# Patient Record
Sex: MALE | Race: WHITE | NOT HISPANIC OR LATINO
[De-identification: names, ages, dates, MRNs, and addresses within clinical notes are randomized per-mention and may not be internally consistent; named-entity substitution may affect disease eponyms.]

---

## 2017-10-23 ENCOUNTER — TRANSCRIPTION ENCOUNTER (OUTPATIENT)
Age: 67
End: 2017-10-23

## 2017-10-25 ENCOUNTER — APPOINTMENT (OUTPATIENT)
Dept: UROLOGY | Facility: CLINIC | Age: 67
End: 2017-10-25
Payer: MEDICARE

## 2017-10-25 VITALS
TEMPERATURE: 98.4 F | RESPIRATION RATE: 16 BRPM | BODY MASS INDEX: 31.5 KG/M2 | SYSTOLIC BLOOD PRESSURE: 126 MMHG | OXYGEN SATURATION: 98 % | HEART RATE: 70 BPM | WEIGHT: 225 LBS | DIASTOLIC BLOOD PRESSURE: 85 MMHG | HEIGHT: 71 IN

## 2017-10-25 DIAGNOSIS — Z86.39 PERSONAL HISTORY OF OTHER ENDOCRINE, NUTRITIONAL AND METABOLIC DISEASE: ICD-10-CM

## 2017-10-25 DIAGNOSIS — E78.00 PURE HYPERCHOLESTEROLEMIA, UNSPECIFIED: ICD-10-CM

## 2017-10-25 PROCEDURE — 99204 OFFICE O/P NEW MOD 45 MIN: CPT

## 2017-10-26 PROBLEM — Z86.39 HISTORY OF HIGH CHOLESTEROL: Status: RESOLVED | Noted: 2017-10-26 | Resolved: 2017-10-26

## 2017-10-26 PROBLEM — E78.00 HIGH CHOLESTEROL: Status: ACTIVE | Noted: 2017-10-26

## 2017-11-14 ENCOUNTER — APPOINTMENT (OUTPATIENT)
Dept: UROLOGY | Facility: CLINIC | Age: 67
End: 2017-11-14
Payer: MEDICARE

## 2017-11-14 VITALS — SYSTOLIC BLOOD PRESSURE: 133 MMHG | DIASTOLIC BLOOD PRESSURE: 81 MMHG

## 2017-11-14 DIAGNOSIS — N41.1 CHRONIC PROSTATITIS: ICD-10-CM

## 2017-11-14 LAB
BILIRUB UR QL STRIP: NORMAL
CLARITY UR: CLEAR
COLLECTION METHOD: NORMAL
GLUCOSE UR-MCNC: NORMAL
HCG UR QL: 0.2 EU/DL
HGB UR QL STRIP.AUTO: NORMAL
KETONES UR-MCNC: NORMAL
LEUKOCYTE ESTERASE UR QL STRIP: NORMAL
NITRITE UR QL STRIP: NORMAL
PH UR STRIP: 5.5
PROT UR STRIP-MCNC: NORMAL
SP GR UR STRIP: 1.02

## 2017-11-14 PROCEDURE — 51741 ELECTRO-UROFLOWMETRY FIRST: CPT

## 2017-11-14 PROCEDURE — 81003 URINALYSIS AUTO W/O SCOPE: CPT | Mod: QW

## 2017-11-14 PROCEDURE — 76872 US TRANSRECTAL: CPT

## 2017-11-14 PROCEDURE — 76857 US EXAM PELVIC LIMITED: CPT

## 2017-11-17 ENCOUNTER — CHART COPY (OUTPATIENT)
Age: 67
End: 2017-11-17

## 2017-11-17 LAB
APPEARANCE: CLEAR
BACTERIA: NEGATIVE
BILIRUBIN URINE: NEGATIVE
BLOOD URINE: NEGATIVE
COLOR: YELLOW
CORE LAB FLUID CYTOLOGY: NORMAL
GLUCOSE QUALITATIVE U: NEGATIVE MG/DL
HYALINE CASTS: 0 /LPF
KETONES URINE: NEGATIVE
LEUKOCYTE ESTERASE URINE: NEGATIVE
MICROSCOPIC-UA: NORMAL
NITRITE URINE: NEGATIVE
PH URINE: 6
PROTEIN URINE: NEGATIVE MG/DL
PSA SERPL-MCNC: 0.58 NG/ML
RED BLOOD CELLS URINE: 0 /HPF
SPECIFIC GRAVITY URINE: 1.01
SQUAMOUS EPITHELIAL CELLS: 0 /HPF
UROBILINOGEN URINE: NEGATIVE MG/DL
WHITE BLOOD CELLS URINE: 0 /HPF

## 2018-03-02 ENCOUNTER — RECORD ABSTRACTING (OUTPATIENT)
Age: 68
End: 2018-03-02

## 2018-03-02 DIAGNOSIS — J33.9 NASAL POLYP, UNSPECIFIED: ICD-10-CM

## 2018-03-02 DIAGNOSIS — Z01.10 ENCOUNTER FOR EXAMINATION OF EARS AND HEARING W/OUT ABNORMAL FINDINGS: ICD-10-CM

## 2018-03-02 DIAGNOSIS — Z91.89 OTHER SPECIFIED PERSONAL RISK FACTORS, NOT ELSEWHERE CLASSIFIED: ICD-10-CM

## 2018-03-02 DIAGNOSIS — Z78.9 OTHER SPECIFIED HEALTH STATUS: ICD-10-CM

## 2018-03-02 DIAGNOSIS — J32.2 CHRONIC ETHMOIDAL SINUSITIS: ICD-10-CM

## 2018-03-02 DIAGNOSIS — Z77.22 CONTACT WITH AND (SUSPECTED) EXPOSURE TO ENVIRONMENTAL TOBACCO SMOKE (ACUTE) (CHRONIC): ICD-10-CM

## 2018-03-02 DIAGNOSIS — F17.210 NICOTINE DEPENDENCE, CIGARETTES, UNCOMPLICATED: ICD-10-CM

## 2018-05-29 ENCOUNTER — APPOINTMENT (OUTPATIENT)
Dept: UROLOGY | Facility: CLINIC | Age: 68
End: 2018-05-29

## 2018-07-16 ENCOUNTER — APPOINTMENT (OUTPATIENT)
Dept: UROLOGY | Facility: CLINIC | Age: 68
End: 2018-07-16
Payer: MEDICARE

## 2018-07-16 VITALS
HEART RATE: 71 BPM | HEIGHT: 71 IN | RESPIRATION RATE: 16 BRPM | TEMPERATURE: 98.3 F | BODY MASS INDEX: 30.8 KG/M2 | DIASTOLIC BLOOD PRESSURE: 72 MMHG | WEIGHT: 220 LBS | SYSTOLIC BLOOD PRESSURE: 104 MMHG | OXYGEN SATURATION: 97 %

## 2018-07-16 PROCEDURE — 99212 OFFICE O/P EST SF 10 MIN: CPT

## 2018-11-17 ENCOUNTER — RX RENEWAL (OUTPATIENT)
Age: 68
End: 2018-11-17

## 2018-11-29 ENCOUNTER — MEDICATION RENEWAL (OUTPATIENT)
Age: 68
End: 2018-11-29

## 2019-02-04 ENCOUNTER — RX RENEWAL (OUTPATIENT)
Age: 69
End: 2019-02-04

## 2019-03-01 ENCOUNTER — MEDICATION RENEWAL (OUTPATIENT)
Age: 69
End: 2019-03-01

## 2019-09-04 ENCOUNTER — APPOINTMENT (OUTPATIENT)
Age: 69
End: 2019-09-04
Payer: MEDICARE

## 2019-09-04 VITALS
WEIGHT: 190 LBS | BODY MASS INDEX: 26.6 KG/M2 | HEART RATE: 116 BPM | HEIGHT: 71 IN | RESPIRATION RATE: 16 BRPM | DIASTOLIC BLOOD PRESSURE: 76 MMHG | SYSTOLIC BLOOD PRESSURE: 116 MMHG | OXYGEN SATURATION: 96 % | TEMPERATURE: 98.1 F

## 2019-09-04 PROCEDURE — 99213 OFFICE O/P EST LOW 20 MIN: CPT

## 2019-09-04 NOTE — REVIEW OF SYSTEMS
[Earache] : earache [see HPI] : see HPI [Poor quality erections] : Poor quality erections [Seen by urologist before (Name)  ___] : Preciously seen by a urologist: [unfilled] [Urine Infection (bladder/kidney)] : bladder/kidney infection [Pain during urination] : pain during urination [Wake up at night to urinate  How many times?  ___] : wakes up to urinate [unfilled] times during the night [Slow urine stream] : slow urine stream [Interrupted urine stream] : interrupted urine stream [Bladder fullness after urinating] : bladder fullness after urinating [Negative] : Heme/Lymph

## 2019-09-04 NOTE — PHYSICAL EXAM
[General Appearance - Well Developed] : well developed [General Appearance - Well Nourished] : well nourished [Normal Appearance] : normal appearance [Well Groomed] : well groomed [General Appearance - In No Acute Distress] : no acute distress [Abdomen Soft] : soft [Costovertebral Angle Tenderness] : no ~M costovertebral angle tenderness [Abdomen Tenderness] : non-tender [Urethral Meatus] : meatus normal [Urinary Bladder Findings] : the bladder was normal on palpation [Scrotum] : the scrotum was normal [Testes Mass (___cm)] : there were no testicular masses [No Prostate Nodules] : no prostate nodules [FreeTextEntry1] : Mild BPH, symmetric [Edema] : no peripheral edema [] : no respiratory distress [Respiration, Rhythm And Depth] : normal respiratory rhythm and effort [Exaggerated Use Of Accessory Muscles For Inspiration] : no accessory muscle use [Oriented To Time, Place, And Person] : oriented to person, place, and time [Affect] : the affect was normal [Mood] : the mood was normal [Not Anxious] : not anxious [Normal Station and Gait] : the gait and station were normal for the patient's age [No Focal Deficits] : no focal deficits [No Palpable Adenopathy] : no palpable adenopathy

## 2019-09-04 NOTE — HISTORY OF PRESENT ILLNESS
[FreeTextEntry1] : This patient presents for evaluation of prostatism. He feels that he is doing well and is not troubled by urinary complaint

## 2019-09-06 LAB
APPEARANCE: CLEAR
BACTERIA: NEGATIVE
BILIRUBIN URINE: NEGATIVE
BLOOD URINE: NEGATIVE
COLOR: YELLOW
GLUCOSE QUALITATIVE U: NEGATIVE
HYALINE CASTS: 2 /LPF
KETONES URINE: NEGATIVE
LEUKOCYTE ESTERASE URINE: NEGATIVE
MICROSCOPIC-UA: NORMAL
NITRITE URINE: NEGATIVE
PH URINE: 6
PROTEIN URINE: NEGATIVE
PSA SERPL-MCNC: 0.5 NG/ML
RED BLOOD CELLS URINE: 1 /HPF
SPECIFIC GRAVITY URINE: 1.02
SQUAMOUS EPITHELIAL CELLS: 1 /HPF
UROBILINOGEN URINE: NORMAL
WHITE BLOOD CELLS URINE: 1 /HPF

## 2019-09-09 LAB — URINE CYTOLOGY: NORMAL

## 2019-09-18 ENCOUNTER — TRANSCRIPTION ENCOUNTER (OUTPATIENT)
Age: 69
End: 2019-09-18

## 2020-07-14 ENCOUNTER — APPOINTMENT (OUTPATIENT)
Dept: OTOLARYNGOLOGY | Facility: CLINIC | Age: 70
End: 2020-07-14
Payer: MEDICARE

## 2020-07-14 VITALS
BODY MASS INDEX: 28 KG/M2 | DIASTOLIC BLOOD PRESSURE: 78 MMHG | SYSTOLIC BLOOD PRESSURE: 113 MMHG | WEIGHT: 200 LBS | HEIGHT: 71 IN | TEMPERATURE: 97.9 F

## 2020-07-14 PROCEDURE — 99213 OFFICE O/P EST LOW 20 MIN: CPT

## 2020-07-14 NOTE — PHYSICAL EXAM
[de-identified] : large amount cerumen cleared ad tm clear cerumen cleared as [Midline] : trachea located in midline position [Normal] : no rashes

## 2020-07-14 NOTE — HISTORY OF PRESENT ILLNESS
[de-identified] : ad plugging x 2 mo like on plane\par pos seasonal allergies, using daily allergra\par hx ad hector and vent tube 2 1/2 y ago

## 2020-07-14 NOTE — REVIEW OF SYSTEMS
[Seasonal Allergies] : seasonal allergies [Ear Itch] : ear itch [Ear Pain] : ear pain [Negative] : Endocrine

## 2020-07-15 ENCOUNTER — APPOINTMENT (OUTPATIENT)
Dept: UROLOGY | Facility: CLINIC | Age: 70
End: 2020-07-15
Payer: MEDICARE

## 2020-07-15 VITALS
OXYGEN SATURATION: 95 % | HEART RATE: 60 BPM | TEMPERATURE: 97.8 F | HEIGHT: 71 IN | DIASTOLIC BLOOD PRESSURE: 75 MMHG | BODY MASS INDEX: 27.72 KG/M2 | WEIGHT: 198 LBS | SYSTOLIC BLOOD PRESSURE: 109 MMHG

## 2020-07-15 PROCEDURE — 99213 OFFICE O/P EST LOW 20 MIN: CPT

## 2020-07-15 NOTE — PHYSICAL EXAM
[General Appearance - Well Developed] : well developed [General Appearance - Well Nourished] : well nourished [Normal Appearance] : normal appearance [Well Groomed] : well groomed [General Appearance - In No Acute Distress] : no acute distress [Abdomen Tenderness] : non-tender [Abdomen Soft] : soft [Costovertebral Angle Tenderness] : no ~M costovertebral angle tenderness [Scrotum] : the scrotum was normal [Urinary Bladder Findings] : the bladder was normal on palpation [Urethral Meatus] : meatus normal [Testes Mass (___cm)] : there were no testicular masses [No Prostate Nodules] : no prostate nodules [Edema] : no peripheral edema [FreeTextEntry1] : Mild BPH, symmetric [] : no respiratory distress [Exaggerated Use Of Accessory Muscles For Inspiration] : no accessory muscle use [Respiration, Rhythm And Depth] : normal respiratory rhythm and effort [Affect] : the affect was normal [Mood] : the mood was normal [Not Anxious] : not anxious [Oriented To Time, Place, And Person] : oriented to person, place, and time [No Focal Deficits] : no focal deficits [Normal Station and Gait] : the gait and station were normal for the patient's age [No Palpable Adenopathy] : no palpable adenopathy

## 2020-07-15 NOTE — HISTORY OF PRESENT ILLNESS
[FreeTextEntry1] : This patient presents for evaluation of prostatism.  He remains on tamsulosin and finasteride and feels that he is doing well on this.  He recently stopped taking his medications for several days and noted definite increase in symptoms with relief once he was taken again

## 2020-07-15 NOTE — REVIEW OF SYSTEMS
[Earache] : earache [Poor quality erections] : Poor quality erections [see HPI] : see HPI [Seen by urologist before (Name)  ___] : Preciously seen by a urologist: [unfilled] [Pain during urination] : pain during urination [Urine Infection (bladder/kidney)] : bladder/kidney infection [Slow urine stream] : slow urine stream [Wake up at night to urinate  How many times?  ___] : wakes up to urinate [unfilled] times during the night [Interrupted urine stream] : interrupted urine stream [Bladder fullness after urinating] : bladder fullness after urinating [Negative] : Psychiatric

## 2020-07-16 LAB
APPEARANCE: CLEAR
BACTERIA: NEGATIVE
BILIRUBIN URINE: NEGATIVE
BLOOD URINE: NEGATIVE
COLOR: YELLOW
GLUCOSE QUALITATIVE U: NEGATIVE
HYALINE CASTS: 1 /LPF
KETONES URINE: NEGATIVE
LEUKOCYTE ESTERASE URINE: ABNORMAL
MICROSCOPIC-UA: NORMAL
NITRITE URINE: NEGATIVE
PH URINE: 6.5
PROTEIN URINE: NORMAL
PSA SERPL-MCNC: 1.55 NG/ML
RED BLOOD CELLS URINE: 2 /HPF
SPECIFIC GRAVITY URINE: 1.03
SQUAMOUS EPITHELIAL CELLS: 1 /HPF
UROBILINOGEN URINE: NORMAL
WHITE BLOOD CELLS URINE: 21 /HPF

## 2020-07-17 LAB — URINE CYTOLOGY: NORMAL

## 2020-10-12 ENCOUNTER — APPOINTMENT (OUTPATIENT)
Dept: OTOLARYNGOLOGY | Facility: CLINIC | Age: 70
End: 2020-10-12
Payer: MEDICARE

## 2020-10-12 VITALS — WEIGHT: 198 LBS | HEIGHT: 71 IN | TEMPERATURE: 97.8 F | BODY MASS INDEX: 27.72 KG/M2

## 2020-10-12 DIAGNOSIS — H60.311 DIFFUSE OTITIS EXTERNA, RIGHT EAR: ICD-10-CM

## 2020-10-12 PROCEDURE — 99213 OFFICE O/P EST LOW 20 MIN: CPT

## 2020-10-12 NOTE — HISTORY OF PRESENT ILLNESS
[de-identified] : allergies sneezing\par several days ago wetness and wax\par using claritin for sneezing

## 2020-10-12 NOTE — PHYSICAL EXAM
[de-identified] : cleared cerumen and debris mild canal erthema [Midline] : trachea located in midline position [Normal] : no rashes

## 2020-10-19 ENCOUNTER — APPOINTMENT (OUTPATIENT)
Dept: OTOLARYNGOLOGY | Facility: CLINIC | Age: 70
End: 2020-10-19
Payer: MEDICARE

## 2020-10-19 VITALS — TEMPERATURE: 98 F | HEIGHT: 71 IN | WEIGHT: 190 LBS | BODY MASS INDEX: 26.6 KG/M2

## 2020-10-19 DIAGNOSIS — H05.011 CELLULITIS OF RIGHT ORBIT: ICD-10-CM

## 2020-10-19 PROCEDURE — 99214 OFFICE O/P EST MOD 30 MIN: CPT

## 2020-10-19 PROCEDURE — 92557 COMPREHENSIVE HEARING TEST: CPT

## 2020-10-19 PROCEDURE — 92567 TYMPANOMETRY: CPT

## 2020-10-19 RX ORDER — AMOXICILLIN AND CLAVULANATE POTASSIUM 875; 125 MG/1; MG/1
875-125 TABLET, COATED ORAL TWICE DAILY
Qty: 20 | Refills: 2 | Status: COMPLETED | COMMUNITY
Start: 2020-10-19 | End: 2020-11-18

## 2020-10-19 NOTE — ASSESSMENT
[FreeTextEntry1] : audio ad cnd loss b tymp ad\par mild rt periorbital cellulitis\par rt hector\par pred 10 #20\par amox clav 875 #20

## 2020-10-19 NOTE — HISTORY OF PRESENT ILLNESS
[de-identified] : co swelling under rt eye x 3 days\par no soreness\par sp ad cleaning  otitis externa\par using ofloxin gtts

## 2020-10-19 NOTE — PHYSICAL EXAM
[Midline] : trachea located in midline position [Normal] : no rashes [FreeTextEntry2] : rt infraorbital swelling and mild erythema, nontender [de-identified] : ad canal w mild erythema tm dull

## 2020-10-30 ENCOUNTER — APPOINTMENT (OUTPATIENT)
Dept: OTOLARYNGOLOGY | Facility: CLINIC | Age: 70
End: 2020-10-30
Payer: MEDICARE

## 2020-10-30 VITALS — HEIGHT: 71 IN | WEIGHT: 190 LBS | TEMPERATURE: 97.4 F | BODY MASS INDEX: 26.6 KG/M2

## 2020-10-30 PROCEDURE — 99213 OFFICE O/P EST LOW 20 MIN: CPT | Mod: 25

## 2020-10-30 PROCEDURE — 69433 CREATE EARDRUM OPENING: CPT | Mod: RT

## 2020-10-30 NOTE — PROCEDURE
[FreeTextEntry2] : rt hector [FreeTextEntry3] : With the patient in an upright position the operating microscope is utilized to visualize the right tympanic membrane which is dull in appearance. retracted\par \par Topical phenyl is applied to the posterior/inferior quadrant.\par \par A small myringotomy incision is placed and a  large amount of  serous fluid is drained.\par \par A standard vent tube is then placed without difficulty.\par \par The procedure was well tolerated.\par \par \par

## 2020-10-30 NOTE — PHYSICAL EXAM
[de-identified] : mike tm retracted dull [Normal] : mucosa is normal [Midline] : trachea located in midline position

## 2020-10-30 NOTE — HISTORY OF PRESENT ILLNESS
[de-identified] : fu persistent ad ear plugging \par rx antibioitic and pred no improvement\par rt hector

## 2020-11-24 ENCOUNTER — APPOINTMENT (OUTPATIENT)
Dept: INTERNAL MEDICINE | Facility: CLINIC | Age: 70
End: 2020-11-24
Payer: MEDICARE

## 2020-11-24 VITALS
RESPIRATION RATE: 16 BRPM | SYSTOLIC BLOOD PRESSURE: 108 MMHG | OXYGEN SATURATION: 96 % | BODY MASS INDEX: 28.56 KG/M2 | TEMPERATURE: 96.6 F | WEIGHT: 204 LBS | HEIGHT: 71 IN | DIASTOLIC BLOOD PRESSURE: 80 MMHG | HEART RATE: 86 BPM

## 2020-11-24 DIAGNOSIS — Z23 ENCOUNTER FOR IMMUNIZATION: ICD-10-CM

## 2020-11-24 DIAGNOSIS — Z72.0 TOBACCO USE: ICD-10-CM

## 2020-11-24 PROCEDURE — 99406 BEHAV CHNG SMOKING 3-10 MIN: CPT | Mod: 25

## 2020-11-24 PROCEDURE — 90662 IIV NO PRSV INCREASED AG IM: CPT

## 2020-11-24 PROCEDURE — G0438: CPT

## 2020-11-24 PROCEDURE — 90732 PPSV23 VACC 2 YRS+ SUBQ/IM: CPT

## 2020-11-24 PROCEDURE — G0008: CPT

## 2020-11-24 PROCEDURE — G0009: CPT

## 2020-11-24 RX ORDER — OFLOXACIN OTIC 3 MG/ML
0.3 SOLUTION AURICULAR (OTIC) TWICE DAILY
Qty: 1 | Refills: 3 | Status: DISCONTINUED | COMMUNITY
Start: 2020-10-12 | End: 2020-11-24

## 2020-11-24 RX ORDER — CIPROFLOXACIN HYDROCHLORIDE 500 MG/1
500 TABLET, FILM COATED ORAL
Qty: 42 | Refills: 0 | Status: DISCONTINUED | COMMUNITY
Start: 2017-10-25 | End: 2020-11-24

## 2020-11-24 RX ORDER — PREDNISONE 10 MG/1
10 TABLET ORAL TWICE DAILY
Qty: 20 | Refills: 1 | Status: DISCONTINUED | COMMUNITY
Start: 2020-10-19 | End: 2020-11-24

## 2020-11-24 RX ORDER — AMOXICILLIN AND CLAVULANATE POTASSIUM 875; 125 MG/1; 1/1
875-125 TABLET, FILM COATED ORAL
Refills: 0 | Status: DISCONTINUED | COMMUNITY
End: 2020-11-24

## 2020-11-24 RX ORDER — PREDNISONE 10 MG/1
10 TABLET ORAL
Refills: 0 | Status: DISCONTINUED | COMMUNITY
End: 2020-11-24

## 2020-11-24 RX ORDER — TAMSULOSIN HYDROCHLORIDE 0.4 MG/1
0.4 CAPSULE ORAL
Qty: 90 | Refills: 3 | Status: DISCONTINUED | COMMUNITY
Start: 2017-11-14 | End: 2020-11-24

## 2020-11-24 RX ORDER — TAMSULOSIN HYDROCHLORIDE 0.4 MG/1
0.4 CAPSULE ORAL
Qty: 7 | Refills: 0 | Status: DISCONTINUED | OUTPATIENT
Start: 2017-10-25 | End: 2020-11-24

## 2020-11-24 NOTE — COUNSELING
[Risk of tobacco use and health benefits of smoking cessation discussed] : Risk of tobacco use and health benefits of smoking cessation discussed [Willing to Quit Smoking] : Not willing to quit smoking [Tobacco Use Cessation Intermediate Greater Than 3 Minutes Up to 10 Minutes] : Tobacco Use Cessation Intermediate Greater Than 3 Minutes Up to 10 Minutes [FreeTextEntry1] : 4

## 2020-11-24 NOTE — HISTORY OF PRESENT ILLNESS
[FreeTextEntry1] : Patient comes in to establish care.\par  [de-identified] : Patient comes in to establish care and annual exam.\par Pt has hx of tobacco use for 30 pack years. He has tried tobacco class and acupuncture in past but did not help him. Has not tried medications at the past. He does have nicotine gum but does not use it. He did have CT lung cancer screen in the past that was negative per patietn. \par Last saw PCP  1 yr ago with normal bw results. Also sees -urologist, had recent bw with normal PSA.\par Patient denies any cp, sob,abdominal pain, nausea, vomiting, palpitations, fever, chills, constipation, diarrhea.\par

## 2020-11-24 NOTE — HEALTH RISK ASSESSMENT
[] : Yes [Yes] : Yes [2 - 3 times a week (3 pts)] : 2 - 3  times a week (3 points) [1 or 2 (0 pts)] : 1 or 2 (0 points) [Never (0 pts)] : Never (0 points) [No] : In the past 12 months have you used drugs other than those required for medical reasons? No [0] : 2) Feeling down, depressed, or hopeless: Not at all (0) [Patient reported colonoscopy was normal] : Patient reported colonoscopy was normal [[id number]] : 0 [ColonoscopyDate] : 2018

## 2020-11-24 NOTE — ASSESSMENT
[FreeTextEntry1] : 1.HM: Pneumovax 23 vaccine given today, Discussed fasting blood work to get.\par \par 2.HLD: recheck fasting lipids, c/w atorvastatin 20 m once daily. refilled today. \par \par 3. BPH: f/u with , c/w tamsulosin and finasteride. PSA normal recently. \par \par 4.tobacco abuse: Counseling provided to the patient for greater than 3 minutes. He does not wish to quit at this time. Patient has had lung cancer screening done in the past.

## 2020-11-25 LAB
ALBUMIN SERPL ELPH-MCNC: 4.5 G/DL
ALP BLD-CCNC: 70 U/L
ALT SERPL-CCNC: 16 U/L
ANION GAP SERPL CALC-SCNC: 8 MMOL/L
AST SERPL-CCNC: 20 U/L
BASOPHILS # BLD AUTO: 0.03 K/UL
BASOPHILS NFR BLD AUTO: 0.5 %
BILIRUB SERPL-MCNC: 0.4 MG/DL
BUN SERPL-MCNC: 13 MG/DL
CALCIUM SERPL-MCNC: 9.4 MG/DL
CHLORIDE SERPL-SCNC: 104 MMOL/L
CHOLEST SERPL-MCNC: 219 MG/DL
CO2 SERPL-SCNC: 26 MMOL/L
CREAT SERPL-MCNC: 0.91 MG/DL
EOSINOPHIL # BLD AUTO: 0.35 K/UL
EOSINOPHIL NFR BLD AUTO: 5.3 %
ESTIMATED AVERAGE GLUCOSE: 114 MG/DL
GLUCOSE SERPL-MCNC: 92 MG/DL
HBA1C MFR BLD HPLC: 5.6 %
HCT VFR BLD CALC: 51.9 %
HDLC SERPL-MCNC: 54 MG/DL
HGB BLD-MCNC: 16.6 G/DL
IMM GRANULOCYTES NFR BLD AUTO: 0.3 %
LDLC SERPL CALC-MCNC: 123 MG/DL
LYMPHOCYTES # BLD AUTO: 2.12 K/UL
LYMPHOCYTES NFR BLD AUTO: 32.2 %
MAN DIFF?: NORMAL
MCHC RBC-ENTMCNC: 32 GM/DL
MCHC RBC-ENTMCNC: 32.2 PG
MCV RBC AUTO: 100.8 FL
MONOCYTES # BLD AUTO: 0.49 K/UL
MONOCYTES NFR BLD AUTO: 7.4 %
NEUTROPHILS # BLD AUTO: 3.57 K/UL
NEUTROPHILS NFR BLD AUTO: 54.3 %
NONHDLC SERPL-MCNC: 165 MG/DL
PLATELET # BLD AUTO: 227 K/UL
POTASSIUM SERPL-SCNC: 4.6 MMOL/L
PROT SERPL-MCNC: 7.6 G/DL
RBC # BLD: 5.15 M/UL
RBC # FLD: 13.2 %
SODIUM SERPL-SCNC: 138 MMOL/L
TRIGL SERPL-MCNC: 211 MG/DL
TSH SERPL-ACNC: 1.37 UIU/ML
WBC # FLD AUTO: 6.58 K/UL

## 2020-11-27 ENCOUNTER — NON-APPOINTMENT (OUTPATIENT)
Age: 70
End: 2020-11-27

## 2021-01-13 ENCOUNTER — APPOINTMENT (OUTPATIENT)
Dept: OTOLARYNGOLOGY | Facility: CLINIC | Age: 71
End: 2021-01-13
Payer: MEDICARE

## 2021-01-13 VITALS — BODY MASS INDEX: 29.4 KG/M2 | TEMPERATURE: 97.9 F | WEIGHT: 210 LBS | HEIGHT: 71 IN

## 2021-01-13 PROCEDURE — 99213 OFFICE O/P EST LOW 20 MIN: CPT

## 2021-01-13 PROCEDURE — 99072 ADDL SUPL MATRL&STAF TM PHE: CPT

## 2021-01-13 NOTE — PHYSICAL EXAM
[de-identified] : dried skin and cerumen cleared nonfunctional tube removed [Midline] : trachea located in midline position [Normal] : no rashes

## 2021-01-13 NOTE — ASSESSMENT
[FreeTextEntry1] : cerumen cleared ad \par symptoms relieved w ad plugging\par resolved hector\par fu prn

## 2021-01-15 ENCOUNTER — APPOINTMENT (OUTPATIENT)
Dept: OTOLARYNGOLOGY | Facility: CLINIC | Age: 71
End: 2021-01-15
Payer: MEDICARE

## 2021-01-15 VITALS — BODY MASS INDEX: 29.4 KG/M2 | WEIGHT: 210 LBS | TEMPERATURE: 97.7 F | HEIGHT: 71 IN

## 2021-01-15 PROCEDURE — 69433 CREATE EARDRUM OPENING: CPT | Mod: RT

## 2021-01-15 PROCEDURE — 99072 ADDL SUPL MATRL&STAF TM PHE: CPT

## 2021-01-15 PROCEDURE — 99213 OFFICE O/P EST LOW 20 MIN: CPT | Mod: 25

## 2021-01-15 NOTE — PHYSICAL EXAM
[Midline] : trachea located in midline position [Normal] : no rashes [de-identified] : ad dull and retracted

## 2021-01-15 NOTE — PROCEDURE
[FreeTextEntry2] : rt hector [FreeTextEntry3] : With the patient in an upright position the operating microscope is utilized to visualize the right tympanic membrane which is dull in appearance.\par \par Topical phenyl is applied to the posterior/inferior quadrant.\par \par A small myringotomy incision is placed and a  large amount of  serous fluid is drained.\par \par A henderson vent tube is then placed without difficulty anterior inferior tm.\par \par The procedure was well tolerated.\par

## 2021-04-09 ENCOUNTER — APPOINTMENT (OUTPATIENT)
Dept: OTOLARYNGOLOGY | Facility: CLINIC | Age: 71
End: 2021-04-09
Payer: MEDICARE

## 2021-04-09 VITALS — TEMPERATURE: 97.8 F | HEIGHT: 71 IN | BODY MASS INDEX: 28.28 KG/M2 | WEIGHT: 202 LBS

## 2021-04-09 PROCEDURE — 99213 OFFICE O/P EST LOW 20 MIN: CPT | Mod: 25

## 2021-04-09 PROCEDURE — 99072 ADDL SUPL MATRL&STAF TM PHE: CPT

## 2021-04-09 PROCEDURE — 69433 CREATE EARDRUM OPENING: CPT | Mod: RT

## 2021-04-09 NOTE — PHYSICAL EXAM
[de-identified] : mike mendoza tm retracted [Midline] : trachea located in midline position [Normal] : no rashes

## 2021-04-09 NOTE — ASSESSMENT
[FreeTextEntry1] : rt hector\par reviewed options\par proceed w ad m and t henderson tub placed without difficulty\par fu 3 mo

## 2021-04-09 NOTE — PROCEDURE
[FreeTextEntry2] : rt hector [FreeTextEntry3] : With the patient in an upright position the operating microscope is utilized to visualize the right tympanic membrane which is dull in appearance.\par \par Topical phenyl is applied to the posterior/inferior quadrant.\par \par A small myringotomy incision is placed and a  large amount of  serous fluid is drained.\par \par A artrong vent tube is then placed without difficulty.\par \par The procedure was well tolerated.\par \par

## 2021-05-17 RX ORDER — TERAZOSIN 5 MG/1
5 CAPSULE ORAL
Qty: 7 | Refills: 0 | Status: DISCONTINUED | OUTPATIENT
Start: 2017-10-25 | End: 2021-05-17

## 2021-08-17 ENCOUNTER — APPOINTMENT (OUTPATIENT)
Dept: OTOLARYNGOLOGY | Facility: CLINIC | Age: 71
End: 2021-08-17
Payer: MEDICARE

## 2021-08-17 VITALS — WEIGHT: 200 LBS | TEMPERATURE: 98.2 F | BODY MASS INDEX: 28 KG/M2 | HEIGHT: 71 IN

## 2021-08-17 DIAGNOSIS — J30.9 ALLERGIC RHINITIS, UNSPECIFIED: ICD-10-CM

## 2021-08-17 DIAGNOSIS — H69.83 OTHER SPECIFIED DISORDERS OF EUSTACHIAN TUBE, BILATERAL: ICD-10-CM

## 2021-08-17 PROCEDURE — 69210 REMOVE IMPACTED EAR WAX UNI: CPT | Mod: RT

## 2021-08-17 PROCEDURE — 99213 OFFICE O/P EST LOW 20 MIN: CPT | Mod: 25

## 2021-08-17 NOTE — PROCEDURE
[Cerumen Impaction] : Cerumen Impaction [FreeTextEntry6] : \par \par \par \par \par \par \par \par \par \par \par \par \par \par Large amount cerumen cleared right ear instrumentation with curettes, forceps and suction.\par Ear canals and tympanic membranes  unremarkable.\par removal ad vent tube nonfunctioning

## 2021-08-17 NOTE — ASSESSMENT
[FreeTextEntry1] : removal nonfunctioning vent tube ad canal\par allergic rhinitis \par eust tube dys\par levocetirizine 5 q hs\par consider ad vent tube

## 2021-08-23 ENCOUNTER — APPOINTMENT (OUTPATIENT)
Dept: UROLOGY | Facility: CLINIC | Age: 71
End: 2021-08-23
Payer: MEDICARE

## 2021-08-23 VITALS
DIASTOLIC BLOOD PRESSURE: 78 MMHG | WEIGHT: 200 LBS | SYSTOLIC BLOOD PRESSURE: 117 MMHG | HEART RATE: 72 BPM | HEIGHT: 71 IN | OXYGEN SATURATION: 93 % | TEMPERATURE: 97.8 F | BODY MASS INDEX: 28 KG/M2

## 2021-08-23 PROCEDURE — 99213 OFFICE O/P EST LOW 20 MIN: CPT

## 2021-08-23 NOTE — PHYSICAL EXAM
[General Appearance - Well Developed] : well developed [Normal Appearance] : normal appearance [General Appearance - Well Nourished] : well nourished [Well Groomed] : well groomed [General Appearance - In No Acute Distress] : no acute distress [Abdomen Soft] : soft [Abdomen Tenderness] : non-tender [Costovertebral Angle Tenderness] : no ~M costovertebral angle tenderness [Urethral Meatus] : meatus normal [Urinary Bladder Findings] : the bladder was normal on palpation [Scrotum] : the scrotum was normal [Testes Mass (___cm)] : there were no testicular masses [No Prostate Nodules] : no prostate nodules [FreeTextEntry1] : Mild BPH, symmetric [Edema] : no peripheral edema [] : no respiratory distress [Respiration, Rhythm And Depth] : normal respiratory rhythm and effort [Exaggerated Use Of Accessory Muscles For Inspiration] : no accessory muscle use [Oriented To Time, Place, And Person] : oriented to person, place, and time [Affect] : the affect was normal [Mood] : the mood was normal [Not Anxious] : not anxious [Normal Station and Gait] : the gait and station were normal for the patient's age [No Focal Deficits] : no focal deficits [No Palpable Adenopathy] : no palpable adenopathy

## 2021-08-23 NOTE — HISTORY OF PRESENT ILLNESS
[FreeTextEntry1] : This patient presents for evaluation of BPH.  He states he is doing well on current medications and needs labs done.

## 2021-08-28 LAB
APPEARANCE: CLEAR
BACTERIA: NEGATIVE
BILIRUBIN URINE: NEGATIVE
BLOOD URINE: NEGATIVE
COLOR: YELLOW
GLUCOSE QUALITATIVE U: NEGATIVE
HYALINE CASTS: 0 /LPF
KETONES URINE: NEGATIVE
LEUKOCYTE ESTERASE URINE: ABNORMAL
MICROSCOPIC-UA: NORMAL
NITRITE URINE: NEGATIVE
PH URINE: 6
PROTEIN URINE: NORMAL
PSA SERPL-MCNC: 0.52 NG/ML
RED BLOOD CELLS URINE: 1 /HPF
SPECIFIC GRAVITY URINE: 1.02
SQUAMOUS EPITHELIAL CELLS: 0 /HPF
UROBILINOGEN URINE: NORMAL
WHITE BLOOD CELLS URINE: 19 /HPF

## 2021-09-04 LAB — URINE CYTOLOGY: NORMAL

## 2021-10-26 RX ORDER — AZITHROMYCIN 500 MG/1
0 TABLET, FILM COATED ORAL
Qty: 0 | Refills: 0 | DISCHARGE
Start: 2021-10-26 | End: 2021-10-30

## 2021-11-04 ENCOUNTER — RX RENEWAL (OUTPATIENT)
Age: 71
End: 2021-11-04

## 2021-11-04 RX ORDER — FINASTERIDE 5 MG/1
5 TABLET, FILM COATED ORAL
Qty: 90 | Refills: 2 | Status: ACTIVE | COMMUNITY
Start: 2017-10-25 | End: 1900-01-01

## 2021-11-15 ENCOUNTER — APPOINTMENT (OUTPATIENT)
Dept: OTOLARYNGOLOGY | Facility: CLINIC | Age: 71
End: 2021-11-15
Payer: MEDICARE

## 2021-11-15 VITALS — WEIGHT: 200 LBS | HEIGHT: 71 IN | TEMPERATURE: 97.6 F | BODY MASS INDEX: 28 KG/M2

## 2021-11-15 DIAGNOSIS — H90.3 SENSORINEURAL HEARING LOSS, BILATERAL: ICD-10-CM

## 2021-11-15 DIAGNOSIS — H65.21 CHRONIC SEROUS OTITIS MEDIA, RIGHT EAR: ICD-10-CM

## 2021-11-15 DIAGNOSIS — H90.11 CONDUCTIVE HEARING LOSS, UNILATERAL, RIGHT EAR, WITH UNRESTRICTED HEARING ON THE CONTRALATERAL SIDE: ICD-10-CM

## 2021-11-15 DIAGNOSIS — H61.21 IMPACTED CERUMEN, RIGHT EAR: ICD-10-CM

## 2021-11-15 PROCEDURE — 69433 CREATE EARDRUM OPENING: CPT | Mod: RT

## 2021-11-15 PROCEDURE — 99213 OFFICE O/P EST LOW 20 MIN: CPT | Mod: 25

## 2021-11-15 NOTE — ASSESSMENT
[FreeTextEntry1] : rt hector\par au high frequency loss\par henderson tube placed\par well tolerated

## 2021-11-15 NOTE — PHYSICAL EXAM
[Midline] : trachea located in midline position [Normal] : temporomandibular joint is normal [de-identified] : ad tm dull witout erythema

## 2021-11-15 NOTE — PROCEDURE
[FreeTextEntry2] : rt hector [FreeTextEntry3] : With the patient in an upright position the operating microscope is utilized to visualize the right tympanic membrane which is dull in appearance.\par \par Topical phenyl is applied to the posterior/inferior quadrant.\par \par A small myringotomy incision is placed and a  large amount amount of  mucoid fluid is drained.\par \par A standard vent tube is then placed without difficulty.\par \par The procedure was well tolerated.\par \par

## 2021-11-15 NOTE — HISTORY OF PRESENT ILLNESS
[de-identified] : co rt ear plugging\par sp ad vent tube 8 mo  using pred  q od without relief\par clear mucous in am

## 2021-11-16 ENCOUNTER — INPATIENT (INPATIENT)
Facility: HOSPITAL | Age: 71
LOS: 2 days | Discharge: HOME | DRG: 309 | End: 2021-11-19
Attending: STUDENT IN AN ORGANIZED HEALTH CARE EDUCATION/TRAINING PROGRAM | Admitting: HOSPITALIST
Payer: MEDICARE

## 2021-11-16 ENCOUNTER — NON-APPOINTMENT (OUTPATIENT)
Age: 71
End: 2021-11-16

## 2021-11-16 VITALS
SYSTOLIC BLOOD PRESSURE: 96 MMHG | DIASTOLIC BLOOD PRESSURE: 81 MMHG | RESPIRATION RATE: 18 BRPM | TEMPERATURE: 98 F | WEIGHT: 199.96 LBS | HEART RATE: 160 BPM | OXYGEN SATURATION: 95 %

## 2021-11-16 DIAGNOSIS — I48.92 UNSPECIFIED ATRIAL FLUTTER: ICD-10-CM

## 2021-11-16 LAB
ALBUMIN SERPL ELPH-MCNC: 3.3 G/DL — SIGNIFICANT CHANGE UP (ref 3.3–5)
ALP SERPL-CCNC: 59 U/L — SIGNIFICANT CHANGE UP (ref 40–120)
ALT FLD-CCNC: 22 U/L — SIGNIFICANT CHANGE UP (ref 12–78)
ANION GAP SERPL CALC-SCNC: 5 MMOL/L — SIGNIFICANT CHANGE UP (ref 5–17)
APPEARANCE UR: ABNORMAL
APTT BLD: 34.1 SEC — SIGNIFICANT CHANGE UP (ref 27.5–35.5)
AST SERPL-CCNC: 19 U/L — SIGNIFICANT CHANGE UP (ref 15–37)
BASOPHILS # BLD AUTO: 0.03 K/UL — SIGNIFICANT CHANGE UP (ref 0–0.2)
BASOPHILS NFR BLD AUTO: 0.3 % — SIGNIFICANT CHANGE UP (ref 0–2)
BILIRUB SERPL-MCNC: 0.6 MG/DL — SIGNIFICANT CHANGE UP (ref 0.2–1.2)
BILIRUB UR-MCNC: NEGATIVE — SIGNIFICANT CHANGE UP
BUN SERPL-MCNC: 16 MG/DL — SIGNIFICANT CHANGE UP (ref 7–23)
CALCIUM SERPL-MCNC: 8.8 MG/DL — SIGNIFICANT CHANGE UP (ref 8.5–10.1)
CHLORIDE SERPL-SCNC: 106 MMOL/L — SIGNIFICANT CHANGE UP (ref 96–108)
CO2 SERPL-SCNC: 29 MMOL/L — SIGNIFICANT CHANGE UP (ref 22–31)
COLOR SPEC: YELLOW — SIGNIFICANT CHANGE UP
CREAT SERPL-MCNC: 1.19 MG/DL — SIGNIFICANT CHANGE UP (ref 0.5–1.3)
DIFF PNL FLD: ABNORMAL
EOSINOPHIL # BLD AUTO: 0.18 K/UL — SIGNIFICANT CHANGE UP (ref 0–0.5)
EOSINOPHIL NFR BLD AUTO: 2.1 % — SIGNIFICANT CHANGE UP (ref 0–6)
GLUCOSE SERPL-MCNC: 127 MG/DL — HIGH (ref 70–99)
GLUCOSE UR QL: NEGATIVE MG/DL — SIGNIFICANT CHANGE UP
HCT VFR BLD CALC: 48.6 % — SIGNIFICANT CHANGE UP (ref 39–50)
HGB BLD-MCNC: 16.4 G/DL — SIGNIFICANT CHANGE UP (ref 13–17)
IMM GRANULOCYTES NFR BLD AUTO: 0.3 % — SIGNIFICANT CHANGE UP (ref 0–1.5)
INR BLD: 1.15 RATIO — SIGNIFICANT CHANGE UP (ref 0.88–1.16)
KETONES UR-MCNC: NEGATIVE — SIGNIFICANT CHANGE UP
LACTATE SERPL-SCNC: 1.4 MMOL/L — SIGNIFICANT CHANGE UP (ref 0.7–2)
LEUKOCYTE ESTERASE UR-ACNC: ABNORMAL
LYMPHOCYTES # BLD AUTO: 2.5 K/UL — SIGNIFICANT CHANGE UP (ref 1–3.3)
LYMPHOCYTES # BLD AUTO: 28.9 % — SIGNIFICANT CHANGE UP (ref 13–44)
MCHC RBC-ENTMCNC: 32.1 PG — SIGNIFICANT CHANGE UP (ref 27–34)
MCHC RBC-ENTMCNC: 33.7 GM/DL — SIGNIFICANT CHANGE UP (ref 32–36)
MCV RBC AUTO: 95.1 FL — SIGNIFICANT CHANGE UP (ref 80–100)
MONOCYTES # BLD AUTO: 0.62 K/UL — SIGNIFICANT CHANGE UP (ref 0–0.9)
MONOCYTES NFR BLD AUTO: 7.2 % — SIGNIFICANT CHANGE UP (ref 2–14)
NEUTROPHILS # BLD AUTO: 5.28 K/UL — SIGNIFICANT CHANGE UP (ref 1.8–7.4)
NEUTROPHILS NFR BLD AUTO: 61.2 % — SIGNIFICANT CHANGE UP (ref 43–77)
NITRITE UR-MCNC: POSITIVE
NT-PROBNP SERPL-SCNC: 1311 PG/ML — HIGH (ref 0–125)
PH UR: 7 — SIGNIFICANT CHANGE UP (ref 5–8)
PLATELET # BLD AUTO: 184 K/UL — SIGNIFICANT CHANGE UP (ref 150–400)
POTASSIUM SERPL-MCNC: 3.7 MMOL/L — SIGNIFICANT CHANGE UP (ref 3.5–5.3)
POTASSIUM SERPL-SCNC: 3.7 MMOL/L — SIGNIFICANT CHANGE UP (ref 3.5–5.3)
PROT SERPL-MCNC: 7.7 GM/DL — SIGNIFICANT CHANGE UP (ref 6–8.3)
PROT UR-MCNC: 15 MG/DL
PROTHROM AB SERPL-ACNC: 13.4 SEC — SIGNIFICANT CHANGE UP (ref 10.6–13.6)
RBC # BLD: 5.11 M/UL — SIGNIFICANT CHANGE UP (ref 4.2–5.8)
RBC # FLD: 12.6 % — SIGNIFICANT CHANGE UP (ref 10.3–14.5)
SODIUM SERPL-SCNC: 140 MMOL/L — SIGNIFICANT CHANGE UP (ref 135–145)
SP GR SPEC: 1.01 — SIGNIFICANT CHANGE UP (ref 1.01–1.02)
TROPONIN I, HIGH SENSITIVITY RESULT: 26.2 NG/L — SIGNIFICANT CHANGE UP
TROPONIN I, HIGH SENSITIVITY RESULT: 28.62 NG/L — SIGNIFICANT CHANGE UP
UROBILINOGEN FLD QL: NEGATIVE MG/DL — SIGNIFICANT CHANGE UP
WBC # BLD: 8.64 K/UL — SIGNIFICANT CHANGE UP (ref 3.8–10.5)
WBC # FLD AUTO: 8.64 K/UL — SIGNIFICANT CHANGE UP (ref 3.8–10.5)

## 2021-11-16 PROCEDURE — 83880 ASSAY OF NATRIURETIC PEPTIDE: CPT

## 2021-11-16 PROCEDURE — 99285 EMERGENCY DEPT VISIT HI MDM: CPT

## 2021-11-16 PROCEDURE — 86803 HEPATITIS C AB TEST: CPT

## 2021-11-16 PROCEDURE — 80053 COMPREHEN METABOLIC PANEL: CPT

## 2021-11-16 PROCEDURE — U0003: CPT

## 2021-11-16 PROCEDURE — 85027 COMPLETE CBC AUTOMATED: CPT

## 2021-11-16 PROCEDURE — 93306 TTE W/DOPPLER COMPLETE: CPT

## 2021-11-16 PROCEDURE — 82962 GLUCOSE BLOOD TEST: CPT

## 2021-11-16 PROCEDURE — 80048 BASIC METABOLIC PNL TOTAL CA: CPT

## 2021-11-16 PROCEDURE — 84484 ASSAY OF TROPONIN QUANT: CPT

## 2021-11-16 PROCEDURE — 71045 X-RAY EXAM CHEST 1 VIEW: CPT | Mod: 26

## 2021-11-16 PROCEDURE — 36415 COLL VENOUS BLD VENIPUNCTURE: CPT

## 2021-11-16 PROCEDURE — 86769 SARS-COV-2 COVID-19 ANTIBODY: CPT

## 2021-11-16 PROCEDURE — 86901 BLOOD TYPING SEROLOGIC RH(D): CPT

## 2021-11-16 PROCEDURE — 76870 US EXAM SCROTUM: CPT | Mod: 26

## 2021-11-16 PROCEDURE — 86850 RBC ANTIBODY SCREEN: CPT

## 2021-11-16 PROCEDURE — 99223 1ST HOSP IP/OBS HIGH 75: CPT

## 2021-11-16 PROCEDURE — 86900 BLOOD TYPING SEROLOGIC ABO: CPT

## 2021-11-16 PROCEDURE — 84443 ASSAY THYROID STIM HORMONE: CPT

## 2021-11-16 PROCEDURE — U0005: CPT

## 2021-11-16 PROCEDURE — 93010 ELECTROCARDIOGRAM REPORT: CPT

## 2021-11-16 RX ORDER — FINASTERIDE 5 MG/1
1 TABLET, FILM COATED ORAL
Qty: 0 | Refills: 0 | DISCHARGE

## 2021-11-16 RX ORDER — HEPARIN SODIUM 5000 [USP'U]/ML
INJECTION INTRAVENOUS; SUBCUTANEOUS
Qty: 25000 | Refills: 0 | Status: DISCONTINUED | OUTPATIENT
Start: 2021-11-16 | End: 2021-11-16

## 2021-11-16 RX ORDER — ENOXAPARIN SODIUM 100 MG/ML
90 INJECTION SUBCUTANEOUS
Refills: 0 | Status: DISCONTINUED | OUTPATIENT
Start: 2021-11-16 | End: 2021-11-17

## 2021-11-16 RX ORDER — SODIUM CHLORIDE 9 MG/ML
1000 INJECTION INTRAMUSCULAR; INTRAVENOUS; SUBCUTANEOUS
Refills: 0 | Status: DISCONTINUED | OUTPATIENT
Start: 2021-11-16 | End: 2021-11-18

## 2021-11-16 RX ORDER — LORATADINE 10 MG/1
10 TABLET ORAL DAILY
Refills: 0 | Status: DISCONTINUED | OUTPATIENT
Start: 2021-11-16 | End: 2021-11-19

## 2021-11-16 RX ORDER — HEPARIN SODIUM 5000 [USP'U]/ML
7500 INJECTION INTRAVENOUS; SUBCUTANEOUS ONCE
Refills: 0 | Status: COMPLETED | OUTPATIENT
Start: 2021-11-16 | End: 2021-11-16

## 2021-11-16 RX ORDER — SODIUM CHLORIDE 9 MG/ML
1000 INJECTION INTRAMUSCULAR; INTRAVENOUS; SUBCUTANEOUS ONCE
Refills: 0 | Status: COMPLETED | OUTPATIENT
Start: 2021-11-16 | End: 2021-11-16

## 2021-11-16 RX ORDER — NICOTINE POLACRILEX 2 MG
1 GUM BUCCAL DAILY
Refills: 0 | Status: DISCONTINUED | OUTPATIENT
Start: 2021-11-16 | End: 2021-11-19

## 2021-11-16 RX ORDER — INFLUENZA VIRUS VACCINE 15; 15; 15; 15 UG/.5ML; UG/.5ML; UG/.5ML; UG/.5ML
0.7 SUSPENSION INTRAMUSCULAR ONCE
Refills: 0 | Status: DISCONTINUED | OUTPATIENT
Start: 2021-11-16 | End: 2021-11-19

## 2021-11-16 RX ORDER — LEVOCETIRIZINE DIHYDROCHLORIDE 0.5 MG/ML
1 SOLUTION ORAL
Qty: 0 | Refills: 0 | DISCHARGE

## 2021-11-16 RX ORDER — HEPARIN SODIUM 5000 [USP'U]/ML
3500 INJECTION INTRAVENOUS; SUBCUTANEOUS EVERY 6 HOURS
Refills: 0 | Status: DISCONTINUED | OUTPATIENT
Start: 2021-11-16 | End: 2021-11-16

## 2021-11-16 RX ORDER — FINASTERIDE 5 MG/1
5 TABLET, FILM COATED ORAL DAILY
Refills: 0 | Status: DISCONTINUED | OUTPATIENT
Start: 2021-11-16 | End: 2021-11-19

## 2021-11-16 RX ORDER — DILTIAZEM HCL 120 MG
60 CAPSULE, EXT RELEASE 24 HR ORAL ONCE
Refills: 0 | Status: COMPLETED | OUTPATIENT
Start: 2021-11-16 | End: 2021-11-16

## 2021-11-16 RX ORDER — TAMSULOSIN HYDROCHLORIDE 0.4 MG/1
0.4 CAPSULE ORAL AT BEDTIME
Refills: 0 | Status: DISCONTINUED | OUTPATIENT
Start: 2021-11-16 | End: 2021-11-19

## 2021-11-16 RX ORDER — ATORVASTATIN CALCIUM 80 MG/1
1 TABLET, FILM COATED ORAL
Qty: 0 | Refills: 0 | DISCHARGE

## 2021-11-16 RX ORDER — ATORVASTATIN CALCIUM 80 MG/1
20 TABLET, FILM COATED ORAL AT BEDTIME
Refills: 0 | Status: DISCONTINUED | OUTPATIENT
Start: 2021-11-16 | End: 2021-11-19

## 2021-11-16 RX ORDER — DILTIAZEM HCL 120 MG
10 CAPSULE, EXT RELEASE 24 HR ORAL ONCE
Refills: 0 | Status: COMPLETED | OUTPATIENT
Start: 2021-11-16 | End: 2021-11-16

## 2021-11-16 RX ORDER — TAMSULOSIN HYDROCHLORIDE 0.4 MG/1
1 CAPSULE ORAL
Qty: 0 | Refills: 0 | DISCHARGE

## 2021-11-16 RX ORDER — HEPARIN SODIUM 5000 [USP'U]/ML
7500 INJECTION INTRAVENOUS; SUBCUTANEOUS EVERY 6 HOURS
Refills: 0 | Status: DISCONTINUED | OUTPATIENT
Start: 2021-11-16 | End: 2021-11-16

## 2021-11-16 RX ORDER — METOPROLOL TARTRATE 50 MG
5 TABLET ORAL ONCE
Refills: 0 | Status: COMPLETED | OUTPATIENT
Start: 2021-11-16 | End: 2021-11-16

## 2021-11-16 RX ORDER — NICOTINE POLACRILEX 2 MG
2 GUM BUCCAL
Refills: 0 | Status: DISCONTINUED | OUTPATIENT
Start: 2021-11-16 | End: 2021-11-19

## 2021-11-16 RX ADMIN — Medication 60 MILLIGRAM(S): at 14:42

## 2021-11-16 RX ADMIN — HEPARIN SODIUM 7500 UNIT(S): 5000 INJECTION INTRAVENOUS; SUBCUTANEOUS at 15:57

## 2021-11-16 RX ADMIN — ATORVASTATIN CALCIUM 20 MILLIGRAM(S): 80 TABLET, FILM COATED ORAL at 22:52

## 2021-11-16 RX ADMIN — TAMSULOSIN HYDROCHLORIDE 0.4 MILLIGRAM(S): 0.4 CAPSULE ORAL at 22:52

## 2021-11-16 RX ADMIN — ENOXAPARIN SODIUM 90 MILLIGRAM(S): 100 INJECTION SUBCUTANEOUS at 22:52

## 2021-11-16 RX ADMIN — Medication 5 MILLIGRAM(S): at 23:49

## 2021-11-16 RX ADMIN — SODIUM CHLORIDE 1000 MILLILITER(S): 9 INJECTION INTRAMUSCULAR; INTRAVENOUS; SUBCUTANEOUS at 14:47

## 2021-11-16 RX ADMIN — SODIUM CHLORIDE 1000 MILLILITER(S): 9 INJECTION INTRAMUSCULAR; INTRAVENOUS; SUBCUTANEOUS at 13:43

## 2021-11-16 RX ADMIN — Medication 10 MILLIGRAM(S): at 13:47

## 2021-11-16 RX ADMIN — HEPARIN SODIUM 1700 UNIT(S)/HR: 5000 INJECTION INTRAVENOUS; SUBCUTANEOUS at 15:56

## 2021-11-16 RX ADMIN — SODIUM CHLORIDE 80 MILLILITER(S): 9 INJECTION INTRAMUSCULAR; INTRAVENOUS; SUBCUTANEOUS at 22:53

## 2021-11-16 RX ADMIN — Medication 1 PATCH: at 20:07

## 2021-11-16 NOTE — ED PROVIDER NOTE - OBJECTIVE STATEMENT
70 y/o M w/ PMHx of BPH,HLD, SHNL, chronic prostatitis presents to ED c/o enlarged L testicle and mild testicle pain  x 2 days. Pt reports on 11/14 he began to feel L testicle pain. Pt reports he initially thought testicle pain was due to having his phone in his pocket for 8 hours. Pt reports testicle is not tender to touch. Denies abd pain, back pain, hematuria.

## 2021-11-16 NOTE — ED PROVIDER NOTE - PROGRESS NOTE DETAILS
Pt with epididymitis without risk factors for GC/Chlam.  Incidentally found to be in aflutter.  No cp/sob/palpitations.  Improved with diltiazem but still in aflutter.  Discussed with Dr. Cooley who recommends a/c.  Started on heparin.  Provided levaquin.  UA positive as well.  Will admit for further w/u and management.

## 2021-11-16 NOTE — ED STATDOCS - PROGRESS NOTE DETAILS
Jimmie SANCHES for ED attending Dr. Lugo: 72 y/o male with left testicular pain for x2 days and subjective founds. Found to be tachy to 160s, will send to [facility] for further eval.

## 2021-11-16 NOTE — ED PROVIDER NOTE - CLINICAL SUMMARY MEDICAL DECISION MAKING FREE TEXT BOX
Will sepsis workup for epididymitis. Pt tachycardia appears to be unrelated to underlying infection cause as pt is currently asymptomatic. I suspect a prior arrythmia either SVT or Afib or aflutter. Pt's repeat BP unremarkable. Will do trial IV fluids and may trial DILT there after.

## 2021-11-16 NOTE — ED ADULT NURSE REASSESSMENT NOTE - NS ED NURSE REASSESS COMMENT FT1
Pt received from JULIÁN Noble-vitals taken and stable. Pt in NSR in the 80s. Updated on plan of care-admission explained. Heparin drip stopped as ordered-pt to be started on Lovenox. Nicotine patch placed to right deltoid. Daughter at bedside. All needs addressed-bed assignment pending. Pt received from JULIÁN Noble-vitals taken and stable. Pt in aflutter in the 80s. Updated on plan of care-admission explained. Heparin drip stopped as ordered-pt to be started on Lovenox. Nicotine patch placed to right deltoid. Daughter at bedside. All needs addressed-bed assignment pending.

## 2021-11-16 NOTE — ED ADULT NURSE NOTE - OBJECTIVE STATEMENT
pt presents to ED c/o L enlarged testicle x 2 days. pt denies associated pain but states testicle felt hard to touch initially and now is edematous. of note, upon assessment pt noted to be in HR 160s, atrial flutter. denies palpitations, dizziness, all associated complaints. denies fevers. rectal temp in ED 99.1. a&ox3. pmhx prostatitis. hx cardiac cath 7 yrs ago @ [facility]

## 2021-11-16 NOTE — ED ADULT TRIAGE NOTE - CHIEF COMPLAINT QUOTE
pt presents to ED due to enlarged testicle since  Sunday pt states he spoke to his PMD who suggested he come to ED for eval, pt denies pain states it feels more like discomfort

## 2021-11-16 NOTE — H&P ADULT - HISTORY OF PRESENT ILLNESS
HPI:  71M w/PMH BPH, HLD, HTN, presents c/o enlarged and "hard" left testicle w/ mild pain over past 2 days. He denies prior similar episode, denies fever/chills, dysuria.  In ED, he was incidentally found to be in atrial flutter in rvr initially, controlled after IVP of dilt. He denies any cp, sob, palpitations or h/o afib/flutter.  He endorses smoking 1.5ppd x50+yrs, denies dx of copd.  Wife at bedside and wants to be updated regarding pt's care daily. At time of my exam, pt states his testicles seem to be back to normal after receiving abx.     In ED, US scrotum : Left-sided epididymitis,  given IV levaquin, +UA, EKG a-flutter, given IVP dilt, HR 80s at time of my exam. bp stable.   Cxr: COPD      PAST MEDICAL & SURGICAL HISTORY:  BPH  HLD (hyperlipidemia)  HTN  No significant past surgical history    Review of Systems:   CONSTITUTIONAL: No fever.  EYES: No eye pain or discharge.  ENMT:  No sinus or throat pain  NECK: No pain or stiffness  RESPIRATORY: No cough, wheezing, chills or hemoptysis; No shortness of breath  CARDIOVASCULAR: No chest pain, palpitations, dizziness, or leg swelling  GASTROINTESTINAL: No abdominal or epigastric pain. No nausea, vomiting, or hematemesis; No diarrhea or constipation. No melena or hematochezia.  GENITOURINARY: No dysuria or incontinence, see hpi ++  NEUROLOGICAL: No headaches, memory loss, loss of strength, numbness, or tremors  SKIN: No rashes.  MUSCULOSKELETAL: No joint pain or swelling; No muscle, back, or extremity pain  PSYCHIATRIC: No depression, anxiety, mood swings, or difficulty sleeping    Allergies  No Known Allergies    Social History:   lives w/ wife  ind ADLs  +smokes 1.5ppd x 50yrs  +etoh on weekends    FAMILY HISTORY:  Father () h/o heart disease  Brother has h/o HD    Home Medications:  atorvastatin 20 mg oral tablet: 1 tab(s) orally once a day (in the morning) (2021 15:34)  Azithromycin 5 Day Dose Pack 250 mg oral tablet: Take 2 tablets by mouth on day 1, then 1 tablet daily on days 2-5  ***Course completed on 10/30/21*** (2021 15:34)  finasteride 5 mg oral tablet: 1 tab(s) orally once a day (in the evening) (2021 15:34)  levocetirizine 5 mg oral tablet: 1 tab(s) orally once a day (in the morning) (2021 15:34)  Pfizer-BioNTech COVID-19 Vaccine PF 30 mcg/0.3 mL intramuscular suspension: 0.3 milliliter(s) intramuscular once  ***3rd dose given in 2021*** (2021 15:34)  predniSONE 20 mg oral tablet: 1 tab(s) orally 2 times a day for 7 days  ***course completed on 21*** (2021 15:34)  tamsulosin 0.4 mg oral capsule: 1 cap(s) orally once a day (at bedtime) (2021 15:34)      MEDICATIONS  (STANDING):  atorvastatin 20 milliGRAM(s) Oral at bedtime  enoxaparin Injectable 90 milliGRAM(s) SubCutaneous two times a day  finasteride 5 milliGRAM(s) Oral daily  loratadine 10 milliGRAM(s) Oral daily  nicotine - 21 mG/24Hr(s) Patch 1 Patch Transdermal daily  sodium chloride 0.9%. 1000 milliLiter(s) (80 mL/Hr) IV Continuous <Continuous>  tamsulosin 0.4 milliGRAM(s) Oral at bedtime    MEDICATIONS  (PRN):  nicotine  Polacrilex Gum 2 milliGRAM(s) Oral every 2 hours PRN breakthrough cravings      PHYSICAL EXAM:  Vital Signs Last 24 Hrs  T(C): 36.4 (2021 21:28), Max: 37.3 (2021 13:25)  T(F): 97.6 (2021 21:28), Max: 99.1 (2021 13:25)  HR: 70 (2021 21:28) (70 - 160)  BP: 102/86 (2021 21:28) (96/81 - 115/92)  BP(mean): 86 (2021 12:53) (86 - 86)  RR: 19 (2021 21:28) (17 - 21)  SpO2: 96% (2021 21:28) (95% - 98%)  GENERAL: NAD, anxious  HEAD:  Atraumatic, Normocephalic  EYES: EOMI, PERRLA, conjunctiva and sclera clear  ENT: normal hearing, no nasal discharge, throat clear, dentition normal  NECK: Supple, No JVD, no LAD, no thyromegaly   CHEST/LUNG: Clear to auscultation bilaterally; No wheeze, respirations unlabored  HEART: Regular rate and rhythm; No murmurs, rubs, or gallops  ABDOMEN: Soft, Nontender, Nondistended; Bowel sounds present, no HSM  : +testicles symmetric, no TTP, +mobile, no erythema  EXTREMITIES:  2+ Peripheral Pulses, No clubbing, cyanosis, or edema  PSYCH: AAOx3, normal behavior  NEUROLOGY: non-focal, sensory and cn 2-12 intact, speech/language intact  SKIN: No visible rashes or lesions    LABS:                        16.4   8.64  )-----------( 184      ( 2021 13:23 )             48.6     11-16    140  |  106  |  16  ----------------------------<  127<H>  3.7   |  29  |  1.19    Ca    8.8      2021 13:23    TPro  7.7  /  Alb  3.3  /  TBili  0.6  /  DBili  x   /  AST  19  /  ALT  22  /  AlkPhos  59  11-16    PT/INR - ( 2021 13:23 )   PT: 13.4 sec;   INR: 1.15 ratio         PTT - ( 2021 13:23 )  PTT:34.1 sec      Urinalysis Basic - ( 2021 13:23 )    Color: Yellow / Appearance: Slightly Turbid / S.015 / pH: x  Gluc: x / Ketone: Negative  / Bili: Negative / Urobili: Negative mg/dL   Blood: x / Protein: 15 mg/dL / Nitrite: Positive   Leuk Esterase: Moderate / RBC: 25-50 /HPF / WBC >50   Sq Epi: x / Non Sq Epi: Moderate / Bacteria: Many        RADIOLOGY & ADDITIONAL TESTS:    Imaging Personally Reviewed:  US scrotum : Left-sided epididymitis  Cxr: COPD  EKG Personally Reviewed:  mark  Consultant(s) Notes Reviewed:    n/a  Care Discussed with Consultants/Other Providers:  ED

## 2021-11-16 NOTE — ED ADULT NURSE REASSESSMENT NOTE - NS ED NURSE REASSESS COMMENT FT1
dinner ordered for patient and tolerated. pt ambulated to bathroom with steady gait. heparin gtt infusing. wife at bedside. updated on POC. pending hospitalist admit orders and bed placement.

## 2021-11-17 LAB
ALBUMIN SERPL ELPH-MCNC: 2.9 G/DL — LOW (ref 3.3–5)
ALP SERPL-CCNC: 50 U/L — SIGNIFICANT CHANGE UP (ref 40–120)
ALT FLD-CCNC: 18 U/L — SIGNIFICANT CHANGE UP (ref 12–78)
ANION GAP SERPL CALC-SCNC: 6 MMOL/L — SIGNIFICANT CHANGE UP (ref 5–17)
AST SERPL-CCNC: 16 U/L — SIGNIFICANT CHANGE UP (ref 15–37)
BILIRUB SERPL-MCNC: 0.7 MG/DL — SIGNIFICANT CHANGE UP (ref 0.2–1.2)
BUN SERPL-MCNC: 13 MG/DL — SIGNIFICANT CHANGE UP (ref 7–23)
CALCIUM SERPL-MCNC: 8.1 MG/DL — LOW (ref 8.5–10.1)
CHLORIDE SERPL-SCNC: 110 MMOL/L — HIGH (ref 96–108)
CO2 SERPL-SCNC: 22 MMOL/L — SIGNIFICANT CHANGE UP (ref 22–31)
COVID-19 NUCLEOCAPSID GAM AB INTERP: NEGATIVE — SIGNIFICANT CHANGE UP
COVID-19 NUCLEOCAPSID TOTAL GAM ANTIBODY RESULT: 0.09 INDEX — SIGNIFICANT CHANGE UP
COVID-19 SPIKE DOMAIN AB INTERP: POSITIVE
COVID-19 SPIKE DOMAIN ANTIBODY RESULT: >250 U/ML — HIGH
CREAT SERPL-MCNC: 1.04 MG/DL — SIGNIFICANT CHANGE UP (ref 0.5–1.3)
GLUCOSE SERPL-MCNC: 102 MG/DL — HIGH (ref 70–99)
HCT VFR BLD CALC: 45.2 % — SIGNIFICANT CHANGE UP (ref 39–50)
HCV AB S/CO SERPL IA: 0.1 S/CO — SIGNIFICANT CHANGE UP (ref 0–0.99)
HCV AB SERPL-IMP: SIGNIFICANT CHANGE UP
HGB BLD-MCNC: 15.2 G/DL — SIGNIFICANT CHANGE UP (ref 13–17)
MCHC RBC-ENTMCNC: 32.3 PG — SIGNIFICANT CHANGE UP (ref 27–34)
MCHC RBC-ENTMCNC: 33.6 GM/DL — SIGNIFICANT CHANGE UP (ref 32–36)
MCV RBC AUTO: 96.2 FL — SIGNIFICANT CHANGE UP (ref 80–100)
PLATELET # BLD AUTO: 150 K/UL — SIGNIFICANT CHANGE UP (ref 150–400)
POTASSIUM SERPL-MCNC: 4.2 MMOL/L — SIGNIFICANT CHANGE UP (ref 3.5–5.3)
POTASSIUM SERPL-SCNC: 4.2 MMOL/L — SIGNIFICANT CHANGE UP (ref 3.5–5.3)
PROT SERPL-MCNC: 6.8 GM/DL — SIGNIFICANT CHANGE UP (ref 6–8.3)
RBC # BLD: 4.7 M/UL — SIGNIFICANT CHANGE UP (ref 4.2–5.8)
RBC # FLD: 12.6 % — SIGNIFICANT CHANGE UP (ref 10.3–14.5)
SARS-COV-2 IGG+IGM SERPL QL IA: 0.09 INDEX — SIGNIFICANT CHANGE UP
SARS-COV-2 IGG+IGM SERPL QL IA: >250 U/ML — HIGH
SARS-COV-2 IGG+IGM SERPL QL IA: NEGATIVE — SIGNIFICANT CHANGE UP
SARS-COV-2 IGG+IGM SERPL QL IA: POSITIVE
SODIUM SERPL-SCNC: 138 MMOL/L — SIGNIFICANT CHANGE UP (ref 135–145)
WBC # BLD: 7.06 K/UL — SIGNIFICANT CHANGE UP (ref 3.8–10.5)
WBC # FLD AUTO: 7.06 K/UL — SIGNIFICANT CHANGE UP (ref 3.8–10.5)

## 2021-11-17 PROCEDURE — 12345: CPT | Mod: NC,GC

## 2021-11-17 PROCEDURE — 99223 1ST HOSP IP/OBS HIGH 75: CPT

## 2021-11-17 PROCEDURE — 93306 TTE W/DOPPLER COMPLETE: CPT | Mod: 26

## 2021-11-17 RX ORDER — DIGOXIN 250 MCG
250 TABLET ORAL ONCE
Refills: 0 | Status: DISCONTINUED | OUTPATIENT
Start: 2021-11-17 | End: 2021-11-17

## 2021-11-17 RX ORDER — DIGOXIN 250 MCG
125 TABLET ORAL ONCE
Refills: 0 | Status: COMPLETED | OUTPATIENT
Start: 2021-11-17 | End: 2021-11-17

## 2021-11-17 RX ORDER — APIXABAN 2.5 MG/1
5 TABLET, FILM COATED ORAL EVERY 12 HOURS
Refills: 0 | Status: DISCONTINUED | OUTPATIENT
Start: 2021-11-17 | End: 2021-11-19

## 2021-11-17 RX ORDER — DILTIAZEM HCL 120 MG
10 CAPSULE, EXT RELEASE 24 HR ORAL
Qty: 125 | Refills: 0 | Status: DISCONTINUED | OUTPATIENT
Start: 2021-11-17 | End: 2021-11-18

## 2021-11-17 RX ORDER — SODIUM CHLORIDE 9 MG/ML
500 INJECTION INTRAMUSCULAR; INTRAVENOUS; SUBCUTANEOUS ONCE
Refills: 0 | Status: COMPLETED | OUTPATIENT
Start: 2021-11-17 | End: 2021-11-17

## 2021-11-17 RX ORDER — CEFTRIAXONE 500 MG/1
2000 INJECTION, POWDER, FOR SOLUTION INTRAMUSCULAR; INTRAVENOUS EVERY 24 HOURS
Refills: 0 | Status: DISCONTINUED | OUTPATIENT
Start: 2021-11-17 | End: 2021-11-19

## 2021-11-17 RX ORDER — DIGOXIN 250 MCG
250 TABLET ORAL ONCE
Refills: 0 | Status: COMPLETED | OUTPATIENT
Start: 2021-11-17 | End: 2021-11-17

## 2021-11-17 RX ADMIN — Medication 10 MG/HR: at 22:29

## 2021-11-17 RX ADMIN — Medication 1 PATCH: at 10:24

## 2021-11-17 RX ADMIN — TAMSULOSIN HYDROCHLORIDE 0.4 MILLIGRAM(S): 0.4 CAPSULE ORAL at 21:47

## 2021-11-17 RX ADMIN — ATORVASTATIN CALCIUM 20 MILLIGRAM(S): 80 TABLET, FILM COATED ORAL at 21:47

## 2021-11-17 RX ADMIN — SODIUM CHLORIDE 500 MILLILITER(S): 9 INJECTION INTRAMUSCULAR; INTRAVENOUS; SUBCUTANEOUS at 04:55

## 2021-11-17 RX ADMIN — Medication 1 PATCH: at 21:38

## 2021-11-17 RX ADMIN — APIXABAN 5 MILLIGRAM(S): 2.5 TABLET, FILM COATED ORAL at 21:47

## 2021-11-17 RX ADMIN — FINASTERIDE 5 MILLIGRAM(S): 5 TABLET, FILM COATED ORAL at 10:19

## 2021-11-17 RX ADMIN — Medication 10 MG/HR: at 06:30

## 2021-11-17 RX ADMIN — Medication 125 MICROGRAM(S): at 02:00

## 2021-11-17 RX ADMIN — CEFTRIAXONE 2000 MILLIGRAM(S): 500 INJECTION, POWDER, FOR SOLUTION INTRAMUSCULAR; INTRAVENOUS at 14:38

## 2021-11-17 RX ADMIN — APIXABAN 5 MILLIGRAM(S): 2.5 TABLET, FILM COATED ORAL at 11:21

## 2021-11-17 RX ADMIN — LORATADINE 10 MILLIGRAM(S): 10 TABLET ORAL at 10:18

## 2021-11-17 RX ADMIN — Medication 100 MILLIGRAM(S): at 16:58

## 2021-11-17 RX ADMIN — Medication 250 MICROGRAM(S): at 03:49

## 2021-11-17 NOTE — CONSULT NOTE ADULT - ASSESSMENT
This is a 71 yr old male with above PMHx who presented to the ED c/o enlarged and hardened left testicle and was found to be in atrial flutter with RVR.  Unknown length of time in AF since he has no symptoms.  CHADSVASC=2 (HTN, Age).  He was started on full dose Lovenox and was changed to PO Eliquis today.  He remains on diltiazem drip for rate control.  2d echo pending to assess LV function.  Has not seen his cardiologist in many years, goes to [place].  He is also on IV antibiotics per ID.    A/P: new onset atrial flutter with RVR in setting of infection  Continue tele monitoring  Continue cardizem drip, titrate down as tolerated, eventual transition to PO  Continue Eliquis  CHADSVASC=2  Keep K>4, Mg>2  Await 2D echo.  Will discuss all of above with EP attending.   This is a 71 yr old male with above PMHx who presented to the ED c/o enlarged and hardened left testicle and was found to be in atrial flutter with RVR.  Unknown length of time in AF since he has no symptoms.  CHADSVASC=2 (HTN, Age).  He was started on full dose Lovenox and was changed to PO Eliquis today.  He remains on diltiazem drip for rate control.  2d echo pending to assess LV function.  Has not seen his cardiologist in many years, goes to [place].  He is also on IV antibiotics per ID.    A/P: new onset atrial flutter with RVR in setting of infection  Continue tele monitoring  Continue cardizem drip, titrate down as tolerated, eventual transition to PO  Continue Eliquis  CHADSVASC=2  Keep K>4, Mg>2  Await 2D echo.  Will discuss all of above with EP attending.  Patient has cardiologist at [place] and may want to have procedures performed there.

## 2021-11-17 NOTE — CONSULT NOTE ADULT - ASSESSMENT
70 y/o Male with h/o BPH, HLD, HTN was admitted on 11/16 for enlarged and "hard" left testicle w/ mild pain over past 2 days PTA. He denies prior similar episode, denies fever/chills, dysuria.  In ED, he was incidentally found to be in atrial flutter in RVR initially, controlled after IVP of dilt. He denies CP, sob, palpitations or h/o afib/flutter. In ED, he was given IV levaquin.    1. Acute left epididimitis. Pyuria. Probable UTI. BPH.  -obtain BC x 2, urine c/s  -start ceftriaxone 2 gm IV qd and doxycyline 100 mg PO q12h   -reason for abx use and side effects reviewed with patient; monitor BMP   -consider urology evaluation  -old chart reviewed to assess prior cultures  -monitor temps  -f/u CBC  -supportive care  2. Other issues:   -care per medicine     72 y/o Male with h/o BPH, HLD, HTN was admitted on 11/16 for enlarged and "hard" left testicle w/ mild pain over past 2 days PTA. He denies prior similar episode, denies fever/chills, dysuria.  In ED, he was incidentally found to be in atrial flutter in RVR initially, controlled after IVP of dilt. He denies CP, sob, palpitations or h/o afib/flutter. In ED, he was given IV levaquin.    1. Acute left epididimitis. Pyuria. Probable UTI. BPH. A,flutter with RVR.   -obtain BC x 2, urine c/s  -start ceftriaxone 2 gm IV qd and doxycyline 100 mg PO q12h   -reason for abx use and side effects reviewed with patient; monitor BMP   -consider urology evaluation  -old chart reviewed to assess prior cultures  -monitor temps  -f/u CBC  -supportive care  2. Other issues:   -care per medicine    d/w Dr. Jaramillo

## 2021-11-17 NOTE — CHART NOTE - NSCHARTNOTEFT_GEN_A_CORE
RN informed me that pt's HR is fluctuating(). Pt was seen by his bed side not in acute distress and have no complaints. On examination- S1 s2 heard regular, chest was clear, and abdomen was soft. His vitals were significant for elevated HR. He has A-Fib on tele. Pt was given metoprolol 5 mg and Digoxin over night but did not show any improvement. Pt was given 500 ml n/s for low blood pressure.  Plan- Cardizem drip was started to control hear rate.   Case was discussed with Dr. Brunson.

## 2021-11-17 NOTE — CONSULT NOTE ADULT - SUBJECTIVE AND OBJECTIVE BOX
HPI:  71M w/PMH BPH, HLD, HTN, presents c/o enlarged and "hard" left testicle w/ mild pain over past 2 days. He denies prior similar episode, denies fever/chills, dysuria.  In ED, he was incidentally found to be in atrial flutter in rvr initially, controlled after IVP of dilt. He denies any cp, sob, palpitations or h/o afib/flutter.  He endorses smoking 1.5ppd x50+yrs, denies dx of copd.  Wife at bedside and wants to be updated regarding pt's care daily. At time of my exam, pt states his testicles seem to be back to normal after receiving abx.     In ED, US scrotum : Left-sided epididymitis,  given IV levaquin, +UA, EKG a-flutter, given IVP dilt, HR 80s at time of my exam. bp stable.       21:  EP asked to see patient for new onset atrial flutter with RVR. Currently rates controlled on diltiazem drip at 5mg/hr. Pt denies any symptoms of palpitations, SOB, CP, dizziness or lightheadedness.  Even during RVR's pt had no symptoms.  TELE: Atrial flutter  bpm, earlier today rates 150-160 bpm  EK/16 2PM: atrial flutter 4:1 81 bpm      PAST MEDICAL & SURGICAL HISTORY:  BPH  HLD (hyperlipidemia)  HTN  No significant past surgical history    Allergies  No Known Allergies    Social History:   lives w/ wife  ind ADLs  +smokes 1.5ppd x 50yrs  +etoh on weekends    FAMILY HISTORY:  Father () h/o heart disease  Brother has h/o HD      MEDICATIONS  (STANDING):  apixaban 5 milliGRAM(s) Oral every 12 hours  atorvastatin 20 milliGRAM(s) Oral at bedtime  cefTRIAXone Injectable. 2000 milliGRAM(s) IV Push every 24 hours  diltiazem Infusion 10 mG/Hr (10 mL/Hr) IV Continuous <Continuous>  doxycycline hyclate Capsule 100 milliGRAM(s) Oral every 12 hours  finasteride 5 milliGRAM(s) Oral daily  influenza  Vaccine (HIGH DOSE) 0.7 milliLiter(s) IntraMuscular once  loratadine 10 milliGRAM(s) Oral daily  nicotine - 21 mG/24Hr(s) Patch 1 Patch Transdermal daily  sodium chloride 0.9%. 1000 milliLiter(s) (80 mL/Hr) IV Continuous <Continuous>  tamsulosin 0.4 milliGRAM(s) Oral at bedtime    MEDICATIONS  (PRN):  nicotine  Polacrilex Gum 2 milliGRAM(s) Oral every 2 hours PRN breakthrough cravings      Urinalysis Basic - ( 2021 13:23 )    Color: Yellow / Appearance: Slightly Turbid / S.015 / pH: x  Gluc: x / Ketone: Negative  / Bili: Negative / Urobili: Negative mg/dL   Blood: x / Protein: 15 mg/dL / Nitrite: Positive   Leuk Esterase: Moderate / RBC: 25-50 /HPF / WBC >50   Sq Epi: x / Non Sq Epi: Moderate / Bacteria: Many        Vital Signs Last 24 Hrs  T(C): 36.5 (2021 09:41), Max: 37 (2021 17:00)  T(F): 97.7 (2021 09:41), Max: 98.6 (2021 17:00)  HR: 84 (2021 09:41) (70 - 149)  BP: 112/57 (2021 09:41) (92/80 - 119/78)  BP(mean): --  RR: 20 (2021 09:41) (17 - 21)  SpO2: 96% (2021 21:28) (96% - 98%)        REVIEW OF SYSTEMS:    CONSTITUTIONAL:  As per HPI.  HEENT:  Eyes:  No diplopia or blurred vision. ENT:  No earache, sore throat or runny nose.  CARDIOVASCULAR:  No pressure, squeezing, strangling, tightness, heaviness or aching about the chest, neck, axilla or epigastrium.  RESPIRATORY:  No cough, shortness of breath, PND or orthopnea.  GASTROINTESTINAL:  No nausea, vomiting or diarrhea.  GENITOURINARY:  As per HPI  MUSCULOSKELETAL:  As per HPI.  SKIN:  No change in skin, hair or nails.  NEUROLOGIC:  No paresthesias, fasciculations, seizures or weakness.  PSYCHIATRIC:  No disorder of thought or mood.  ENDOCRINE:  No heat or cold intolerance, polyuria or polydipsia.  HEMATOLOGICAL:  No easy bruising or bleedings:      PHYSICAL EXAMINATION:    GENERAL APPEARANCE:  Pt. is not currently dyspneic, in no distress. Pt. is alert, oriented, and pleasant.  HEENT:  Pupils are normal and react normally. No icterus. Mucous membranes well colored.  NECK:  Supple. No lymphadenopathy. Jugular venous pressure not elevated. Carotids equal.   HEART:   The cardiac impulse has a normal quality. There are no murmurs, rubs or gallops noted  CHEST:  Chest is clear to auscultation. Normal respiratory effort.  ABDOMEN:  Soft and nontender.   EXTREMITIES:  There is no edema.   SKIN:  No rash or significant lesions are noted.        LABS:                        15.2   7.06  )-----------( 150      ( 2021 07:41 )             45.2     11-17    138  |  110<H>  |  13  ----------------------------<  102<H>  4.2   |  22  |  1.04    Ca    8.1<L>      2021 07:41    TPro  6.8  /  Alb  2.9<L>  /  TBili  0.7  /  DBili  x   /  AST  16  /  ALT  18  /  AlkPhos  50  11-17    LIVER FUNCTIONS - ( 2021 07:41 )  Alb: 2.9 g/dL / Pro: 6.8 gm/dL / ALK PHOS: 50 U/L / ALT: 18 U/L / AST: 16 U/L / GGT: x           PT/INR - ( 2021 13:23 )   PT: 13.4 sec;   INR: 1.15 ratio         PTT - ( 2021 13:23 )  PTT:34.1 sec    TroponinI hsT: <-26.20, <-28.62      Urinalysis Basic - ( 2021 13:23 )    Color: Yellow / Appearance: Slightly Turbid / S.015 / pH: x  Gluc: x / Ketone: Negative  / Bili: Negative / Urobili: Negative mg/dL   Blood: x / Protein: 15 mg/dL / Nitrite: Positive   Leuk Esterase: Moderate / RBC: 25-50 /HPF / WBC >50   Sq Epi: x / Non Sq Epi: Moderate / Bacteria: Many        CARDIAC TESTS:             HPI:  71M w/PMH BPH, HLD, HTN, presents c/o enlarged and "hard" left testicle w/ mild pain over past 2 days. He denies prior similar episode, denies fever/chills, dysuria.  In ED, he was incidentally found to be in atrial flutter in rvr initially, controlled after IVP of dilt. He denies any cp, sob, palpitations or h/o afib/flutter.  He endorses smoking 1.5ppd x50+yrs, denies dx of copd.  Wife at bedside and wants to be updated regarding pt's care daily. At time of my exam, pt states his testicles seem to be back to normal after receiving abx.       21:  EP asked to see patient for new onset atrial flutter with RVR. Currently rates controlled on diltiazem drip at 5mg/hr. Pt denies any symptoms of palpitations, SOB, CP, dizziness or lightheadedness.  Even during RVR's pt had no symptoms.  TELE: Atrial flutter  bpm, earlier today rates 150-160 bpm  EK/16 2PM: atrial flutter 4:1 81 bpm      PAST MEDICAL & SURGICAL HISTORY:  BPH  HLD (hyperlipidemia)  HTN  No significant past surgical history    Allergies  No Known Allergies    Social History:   lives w/ wife  ind ADLs  +smokes 1.5ppd x 50yrs  +etoh on weekends    FAMILY HISTORY:  Father () h/o heart disease  Brother has h/o HD      MEDICATIONS  (STANDING):  apixaban 5 milliGRAM(s) Oral every 12 hours  atorvastatin 20 milliGRAM(s) Oral at bedtime  cefTRIAXone Injectable. 2000 milliGRAM(s) IV Push every 24 hours  diltiazem Infusion 10 mG/Hr (10 mL/Hr) IV Continuous <Continuous>  doxycycline hyclate Capsule 100 milliGRAM(s) Oral every 12 hours  finasteride 5 milliGRAM(s) Oral daily  influenza  Vaccine (HIGH DOSE) 0.7 milliLiter(s) IntraMuscular once  loratadine 10 milliGRAM(s) Oral daily  nicotine - 21 mG/24Hr(s) Patch 1 Patch Transdermal daily  sodium chloride 0.9%. 1000 milliLiter(s) (80 mL/Hr) IV Continuous <Continuous>  tamsulosin 0.4 milliGRAM(s) Oral at bedtime    MEDICATIONS  (PRN):  nicotine  Polacrilex Gum 2 milliGRAM(s) Oral every 2 hours PRN breakthrough cravings      Urinalysis Basic - ( 2021 13:23 )    Color: Yellow / Appearance: Slightly Turbid / S.015 / pH: x  Gluc: x / Ketone: Negative  / Bili: Negative / Urobili: Negative mg/dL   Blood: x / Protein: 15 mg/dL / Nitrite: Positive   Leuk Esterase: Moderate / RBC: 25-50 /HPF / WBC >50   Sq Epi: x / Non Sq Epi: Moderate / Bacteria: Many        Vital Signs Last 24 Hrs  T(C): 36.5 (2021 09:41), Max: 37 (2021 17:00)  T(F): 97.7 (2021 09:41), Max: 98.6 (2021 17:00)  HR: 84 (2021 09:41) (70 - 149)  BP: 112/57 (2021 09:41) (92/80 - 119/78)  BP(mean): --  RR: 20 (2021 09:41) (17 - 21)  SpO2: 96% (2021 21:28) (96% - 98%)        REVIEW OF SYSTEMS:    CONSTITUTIONAL:  As per HPI.  HEENT:  Eyes:  No diplopia or blurred vision. ENT:  No earache, sore throat or runny nose.  CARDIOVASCULAR:  No pressure, squeezing, strangling, tightness, heaviness or aching about the chest, neck, axilla or epigastrium.  RESPIRATORY:  No cough, shortness of breath, PND or orthopnea.  GASTROINTESTINAL:  No nausea, vomiting or diarrhea.  GENITOURINARY:  As per HPI  MUSCULOSKELETAL:  As per HPI.  SKIN:  No change in skin, hair or nails.  NEUROLOGIC:  No paresthesias, fasciculations, seizures or weakness.  PSYCHIATRIC:  No disorder of thought or mood.  ENDOCRINE:  No heat or cold intolerance, polyuria or polydipsia.  HEMATOLOGICAL:  No easy bruising or bleedings:      PHYSICAL EXAMINATION:    GENERAL APPEARANCE:  Pt. is not currently dyspneic, in no distress. Pt. is alert, oriented, and pleasant.  HEENT:  Pupils are normal and react normally. No icterus. Mucous membranes well colored.  NECK:  Supple. No lymphadenopathy. Jugular venous pressure not elevated. Carotids equal.   HEART:   The cardiac impulse has a normal quality. There are no murmurs, rubs or gallops noted  CHEST:  Chest is clear to auscultation. Normal respiratory effort.  ABDOMEN:  Soft and nontender.   EXTREMITIES:  There is no edema.   SKIN:  No rash or significant lesions are noted.        LABS:                        15.2   7.06  )-----------( 150      ( 2021 07:41 )             45.2     11-17    138  |  110<H>  |  13  ----------------------------<  102<H>  4.2   |  22  |  1.04    Ca    8.1<L>      2021 07:41    TPro  6.8  /  Alb  2.9<L>  /  TBili  0.7  /  DBili  x   /  AST  16  /  ALT  18  /  AlkPhos  50  11-17    LIVER FUNCTIONS - ( 2021 07:41 )  Alb: 2.9 g/dL / Pro: 6.8 gm/dL / ALK PHOS: 50 U/L / ALT: 18 U/L / AST: 16 U/L / GGT: x           PT/INR - ( 2021 13:23 )   PT: 13.4 sec;   INR: 1.15 ratio         PTT - ( 2021 13:23 )  PTT:34.1 sec    TroponinI hsT: <-26.20, <-28.62      Urinalysis Basic - ( 2021 13:23 )    Color: Yellow / Appearance: Slightly Turbid / S.015 / pH: x  Gluc: x / Ketone: Negative  / Bili: Negative / Urobili: Negative mg/dL   Blood: x / Protein: 15 mg/dL / Nitrite: Positive   Leuk Esterase: Moderate / RBC: 25-50 /HPF / WBC >50   Sq Epi: x / Non Sq Epi: Moderate / Bacteria: Many        CARDIAC TESTS:

## 2021-11-17 NOTE — PROGRESS NOTE ADULT - SUBJECTIVE AND OBJECTIVE BOX
Reason for Admission: aflutter  History of Present Illness:   HPI:  71M w/PMH BPH, HLD, HTN, presents c/o enlarged and "hard" left testicle w/ mild pain over past 2 days. He denies prior similar episode, denies fever/chills, dysuria.  In ED, he was incidentally found to be in atrial flutter in rvr initially, controlled after IVP of dilt. He denies any cp, sob, palpitations or h/o afib/flutter.  He endorses smoking 1.5ppd x50+yrs, denies dx of copd.  Wife at bedside and wants to be updated regarding pt's care daily. At time of my exam, pt states his testicles seem to be back to normal after receiving abx.     Medical progress: Denies any HA, CP, SOB. no fevers, chills or shakes. Labs and vitals reviewed. Patient with UTI / Atrial fibrillation  Complaints: no new complaints  State of mind: normal coversation    REVIEW OF SYSTEMS:  General: NAD, hemodynamically stable   HEENT:  Eyes:  No visual loss   SKIN:  No rash or itching.  CARDIOVASCULAR:  No chest pain   RESPIRATORY:  No shortness of breath   GASTROINTESTINAL:  No anorexia, nausea   NEUROLOGICAL:  No headache, dizziness, syncope, paralysis, ataxia, numbness or tingling in the extremities. No change in bowel or bladder control.  MUSCULOSKELETAL:  No muscle, back pain, joint pain or stiffness.  HEMATOLOGIC:  No anemia, bleeding or bruising.  LYMPHATICS:  No enlarged nodes. No history of splenectomy.  ENDOCRINOLOGIC:  No reports of sweating, cold or heat intolerance. No polyuria or polydipsia.  ALLERGIES:  No history of asthma, hives, eczema or rhinitis.    Physical Exam:   GENERAL APPEARANCE:  NAD, hemodynamically stable  T(C): 36.6 (11-17-21 @ 16:06), Max: 36.6 (11-17-21 @ 16:06)  HR: 90 (11-17-21 @ 16:06) (70 - 149)  BP: 99/55 (11-17-21 @ 16:06) (92/80 - 119/78)  RR: 20 (11-17-21 @ 09:41) (19 - 20)  SpO2: 96% (11-16-21 @ 21:28) (96% - 96%)  Wt(kg): --  HEENT:  Head is normocephalic    Skin:  Warm and dry without any rash   NECK:  Supple without lymphadenopathy.   HEART:  Regular rate and rhythm. normal S1 and S2, No M/R/G  LUNGS:  Good ins/exp effort, no W/R/R/C  ABDOMEN:  Soft, nontender, nondistended with good bowel sounds heard  EXTREMITIES:  Without cyanosis, clubbing or edema.   NEUROLOGICAL:  Gross nonfocal     Pt is a 71M w/PMH BPH, HLD, HTN, presents c/o enlarged and "hard" left testicle w/ mild pain over past 2 days.   +smoker. In ED, US scrotum : Left-sided epididymitis,  given IV levaquin, +UA, EKG a-flutter, given IVP dilt, HR 80s at time of my exam. bp stable.   Cxr: COPD      # Epididymitis  # UTI  - Rocephin 2g   - doxy 100 mg  - care discussed with Dr. Graham    # New onset atrial flutter with RVR in setting of infection  - tele monitor  - Continue Cardizem drip, titrate down as tolerated, eventual transition to PO  - Continue Eliquis  - CHADSVASC - 2  - Await 2D echo.  - Patient has cardiologist at [facility] [place] and may want to have procedures performed there.    # tobacco use d/o  -  for cessation  - sweta patch and prn sweta gum     #PPX  - for dvt- on lovenox as above.

## 2021-11-17 NOTE — CONSULT NOTE ADULT - SUBJECTIVE AND OBJECTIVE BOX
Patient is a 71y old  Male who presents with a chief complaint of aflutter    HPI:  70 y/o Male with h/o BPH, HLD, HTN was admitted on  for enlarged and "hard" left testicle w/ mild pain over past 2 days PTA. He denies prior similar episode, denies fever/chills, dysuria.  In ED, he was incidentally found to be in atrial flutter in RVR initially, controlled after IVP of dilt. He denies CP, sob, palpitations or h/o afib/flutter. In ED, he was given IV levaquin.    PAST MEDICAL & SURGICAL HISTORY:  BPH  HLD (hyperlipidemia)  HTN  No significant past surgical history    Meds: per reconciliation sheet, noted below  MEDICATIONS  (STANDING):  apixaban 5 milliGRAM(s) Oral every 12 hours  atorvastatin 20 milliGRAM(s) Oral at bedtime  diltiazem Infusion 10 mG/Hr (10 mL/Hr) IV Continuous <Continuous>  finasteride 5 milliGRAM(s) Oral daily  influenza  Vaccine (HIGH DOSE) 0.7 milliLiter(s) IntraMuscular once  levoFLOXacin  Tablet 750 milliGRAM(s) Oral every 24 hours  loratadine 10 milliGRAM(s) Oral daily  nicotine - 21 mG/24Hr(s) Patch 1 Patch Transdermal daily  sodium chloride 0.9%. 1000 milliLiter(s) (80 mL/Hr) IV Continuous <Continuous>  tamsulosin 0.4 milliGRAM(s) Oral at bedtime    MEDICATIONS  (PRN):  nicotine  Polacrilex Gum 2 milliGRAM(s) Oral every 2 hours PRN breakthrough cravings    Allergies    No Known Allergies    Intolerances      Social: He endorses smoking 1.5ppd x50+yrs, no alcohol, no illegal drugs; no recent travel, no exposure to TB  FAMILY HISTORY:  Father () h/o heart disease  Brother has h/o HD    ROS: the patient denies fever, no chills, no HA, no seizures, no dizziness, no sore throat, no nasal congestion, no blurry vision, no CP, no palpitations, no SOB, no cough, no abdominal pain, no diarrhea, no N/V, no dysuria, no leg pain, no claudication, no rash, no joint aches, no rectal pain or bleeding, no night sweats; has left testicular swelling  All other systems reviewed and are negative    Vital Signs Last 24 Hrs  T(C): 36.5 (2021 09:41), Max: 37.3 (2021 13:25)  T(F): 97.7 (2021 09:41), Max: 99.1 (2021 13:25)  HR: 84 (2021 09:41) (70 - 160)  BP: 112/57 (2021 09:41) (92/80 - 119/78)  BP(mean): 86 (2021 12:53) (86 - 86)  RR: 20 (2021 09:41) (17 - 21)  SpO2: 96% (2021 21:28) (95% - 98%)  Daily     Daily Weight in k.3 (2021 06:52)    PE:    Constitutional:  No acute distress  HEENT: NC/AT, EOMI, PERRLA, conjunctivae clear; ears and nose atraumatic; pharynx benign  Neck: supple; thyroid not palpable  Back: no tenderness  Respiratory: respiratory effort normal; clear to auscultation  Cardiovascular: S1S2 regular, no murmurs  Abdomen: soft, not tender, not distended, positive BS; no liver or spleen organomegaly  Genitourinary: no suprapubic tenderness  Left testicular swelling and tenderness  Lymphatic: no LN palpable  Musculoskeletal: no muscle tenderness, no joint swelling or tenderness  Extremities: no pedal edema  Neurological/ Psychiatric: AxOx3, judgement and insight normal; moving all extremities  Skin: no rashes; no palpable lesions    Labs: all available labs reviewed                        15.2   7.06  )-----------( 150      ( 2021 07:41 )             45.2     11-17    138  |  110<H>  |  13  ----------------------------<  102<H>  4.2   |  22  |  1.04    Ca    8.1<L>      2021 07:41    TPro  6.8  /  Alb  2.9<L>  /  TBili  0.7  /  DBili  x   /  AST  16  /  ALT  18  /  AlkPhos  50  11-17     LIVER FUNCTIONS - ( 2021 07:41 )  Alb: 2.9 g/dL / Pro: 6.8 gm/dL / ALK PHOS: 50 U/L / ALT: 18 U/L / AST: 16 U/L / GGT: x           Urinalysis Basic - ( 2021 13:23 )    Color: Yellow / Appearance: Slightly Turbid / S.015 / pH: x  Gluc: x / Ketone: Negative  / Bili: Negative / Urobili: Negative mg/dL   Blood: x / Protein: 15 mg/dL / Nitrite: Positive   Leuk Esterase: Moderate / RBC: 25-50 /HPF / WBC >50   Sq Epi: x / Non Sq Epi: Moderate / Bacteria: Many    COVID-19 PCR: NotDetec (21 @ 13:24)        Radiology: all available radiological tests reviewed    < from: US Testicles (21 @ 14:26) >  Left testis: 3.8 cm x 4.9 cm x 2.0 cm. Normal echogenicity and echotexture with no masses or areas of architectural distortion. Normal arterial and venous blood flow pattern.  Left epididymis: Hypervascularity in the body and tail with enlargement.  < end of copied text >    < from: Xray Chest 1 View-PORTABLE IMMEDIATE (21 @ 13:46) >  IMPRESSION: COPD.  < end of copied text >      Advanced directives addressed: full resuscitation

## 2021-11-18 LAB
ANION GAP SERPL CALC-SCNC: 7 MMOL/L — SIGNIFICANT CHANGE UP (ref 5–17)
BUN SERPL-MCNC: 13 MG/DL — SIGNIFICANT CHANGE UP (ref 7–23)
CALCIUM SERPL-MCNC: 8.4 MG/DL — LOW (ref 8.5–10.1)
CHLORIDE SERPL-SCNC: 109 MMOL/L — HIGH (ref 96–108)
CO2 SERPL-SCNC: 21 MMOL/L — LOW (ref 22–31)
CREAT SERPL-MCNC: 0.88 MG/DL — SIGNIFICANT CHANGE UP (ref 0.5–1.3)
GLUCOSE SERPL-MCNC: 101 MG/DL — HIGH (ref 70–99)
HCT VFR BLD CALC: 43.8 % — SIGNIFICANT CHANGE UP (ref 39–50)
HGB BLD-MCNC: 15 G/DL — SIGNIFICANT CHANGE UP (ref 13–17)
MCHC RBC-ENTMCNC: 32.7 PG — SIGNIFICANT CHANGE UP (ref 27–34)
MCHC RBC-ENTMCNC: 34.2 GM/DL — SIGNIFICANT CHANGE UP (ref 32–36)
MCV RBC AUTO: 95.4 FL — SIGNIFICANT CHANGE UP (ref 80–100)
PLATELET # BLD AUTO: 171 K/UL — SIGNIFICANT CHANGE UP (ref 150–400)
POTASSIUM SERPL-MCNC: 3.7 MMOL/L — SIGNIFICANT CHANGE UP (ref 3.5–5.3)
POTASSIUM SERPL-SCNC: 3.7 MMOL/L — SIGNIFICANT CHANGE UP (ref 3.5–5.3)
RBC # BLD: 4.59 M/UL — SIGNIFICANT CHANGE UP (ref 4.2–5.8)
RBC # FLD: 12.5 % — SIGNIFICANT CHANGE UP (ref 10.3–14.5)
SODIUM SERPL-SCNC: 137 MMOL/L — SIGNIFICANT CHANGE UP (ref 135–145)
WBC # BLD: 6.89 K/UL — SIGNIFICANT CHANGE UP (ref 3.8–10.5)
WBC # FLD AUTO: 6.89 K/UL — SIGNIFICANT CHANGE UP (ref 3.8–10.5)

## 2021-11-18 PROCEDURE — 99233 SBSQ HOSP IP/OBS HIGH 50: CPT

## 2021-11-18 PROCEDURE — 99232 SBSQ HOSP IP/OBS MODERATE 35: CPT

## 2021-11-18 RX ORDER — METOPROLOL TARTRATE 50 MG
5 TABLET ORAL EVERY 6 HOURS
Refills: 0 | Status: DISCONTINUED | OUTPATIENT
Start: 2021-11-18 | End: 2021-11-18

## 2021-11-18 RX ORDER — DILTIAZEM HCL 120 MG
5 CAPSULE, EXT RELEASE 24 HR ORAL
Qty: 125 | Refills: 0 | Status: DISCONTINUED | OUTPATIENT
Start: 2021-11-18 | End: 2021-11-19

## 2021-11-18 RX ORDER — DILTIAZEM HCL 120 MG
30 CAPSULE, EXT RELEASE 24 HR ORAL
Refills: 0 | Status: DISCONTINUED | OUTPATIENT
Start: 2021-11-18 | End: 2021-11-18

## 2021-11-18 RX ORDER — METOPROLOL TARTRATE 50 MG
50 TABLET ORAL EVERY 6 HOURS
Refills: 0 | Status: DISCONTINUED | OUTPATIENT
Start: 2021-11-18 | End: 2021-11-19

## 2021-11-18 RX ADMIN — Medication 1 PATCH: at 20:20

## 2021-11-18 RX ADMIN — Medication 50 MILLIGRAM(S): at 19:35

## 2021-11-18 RX ADMIN — Medication 100 MILLIGRAM(S): at 18:50

## 2021-11-18 RX ADMIN — Medication 1 PATCH: at 10:05

## 2021-11-18 RX ADMIN — CEFTRIAXONE 2000 MILLIGRAM(S): 500 INJECTION, POWDER, FOR SOLUTION INTRAMUSCULAR; INTRAVENOUS at 10:04

## 2021-11-18 RX ADMIN — ATORVASTATIN CALCIUM 20 MILLIGRAM(S): 80 TABLET, FILM COATED ORAL at 21:28

## 2021-11-18 RX ADMIN — Medication 100 MILLIGRAM(S): at 05:07

## 2021-11-18 RX ADMIN — Medication 5 MILLIGRAM(S): at 12:05

## 2021-11-18 RX ADMIN — APIXABAN 5 MILLIGRAM(S): 2.5 TABLET, FILM COATED ORAL at 21:28

## 2021-11-18 RX ADMIN — LORATADINE 10 MILLIGRAM(S): 10 TABLET ORAL at 10:04

## 2021-11-18 RX ADMIN — FINASTERIDE 5 MILLIGRAM(S): 5 TABLET, FILM COATED ORAL at 10:04

## 2021-11-18 RX ADMIN — APIXABAN 5 MILLIGRAM(S): 2.5 TABLET, FILM COATED ORAL at 10:04

## 2021-11-18 RX ADMIN — Medication 5 MG/HR: at 19:40

## 2021-11-18 RX ADMIN — TAMSULOSIN HYDROCHLORIDE 0.4 MILLIGRAM(S): 0.4 CAPSULE ORAL at 21:28

## 2021-11-18 NOTE — PROGRESS NOTE ADULT - ASSESSMENT
70 y/o Male with h/o BPH, HLD, HTN was admitted on 11/16 for enlarged and "hard" left testicle w/ mild pain over past 2 days PTA. He denies prior similar episode, denies fever/chills, dysuria.  In ED, he was incidentally found to be in atrial flutter in RVR initially, controlled after IVP of dilt. He denies CP, sob, palpitations or h/o afib/flutter. In ED, he was given IV levaquin.    1. Acute left epididimitis. Pyuria. UTI with GNR. BPH. A,flutter with RVR.   -BC x 2, urine c/s noted  -on ceftriaxone 2 gm IV qd and doxycyline 100 mg PO q12h # 2  -tolerating abx well so far; no side effects noted  -cardiology evaluation appreciated  -continue abx coverage   -monitor temps  -f/u CBC  -supportive care  2. Other issues:   -care per medicine    d/w Dr. Jaramillo

## 2021-11-18 NOTE — CHART NOTE - NSCHARTNOTEFT_GEN_A_CORE
Patient given Lopressor IV for improved rate cotnroll  Pt hs decided to have ablation here at .  He will be discharged once his heart rate is controlled for 24 hours and then will be scheduled for Monday to have JEANETTE/CV.  The patient was informed as well as his wife.  Pt is competent, has capacity, and understands risks and benefits of  ablation procedure. Risks and benefits discussed. Risk discussed included, but not limited to MI, stroke, mortality, major bleeding, arrythmia, or infection. All questions answered .    Stop Cardizem IV at 6pm and begin Cardizem 30 mgs q 6 hours.  Will continue with Lopressor IV q hour PRN with holds  Pt can be sent home with Metoprolol tartrate 25 mgs to be taken PRN (once daily if necessary for break through arrhthymias. Patient given Lopressor IV for improved rate cotnroll  Pt hs decided to have ablation here at .  He will be discharged once his heart rate is controlled for 24 hours and then will be scheduled for Monday to have JEANETTE and flutter ablation.  The patient was informed as well as his wife.  Pt is competent, has capacity, and understands risks and benefits of  ablation procedure. Risks and benefits discussed. Risk discussed included, but not limited to MI, stroke, mortality, major bleeding, arrythmia, or infection. All questions answered .    Stop Cardizem IV at 6pm and begin Cardizem 30 mgs q 6 hours.  Will continue with Lopressor IV q hour PRN with holds  Pt can be sent home with Metoprolol tartrate 25 mgs to be taken PRN (once daily if necessary for break through arrhthymias.

## 2021-11-18 NOTE — CHART NOTE - NSCHARTNOTEFT_GEN_A_CORE
After further discussion with this pt, he prefers to post pone the ablation.  He has a relationship with Dr. Miramontes at [facility].  Dr. Jaramillo is attempting to make arrangements for transfer to [facility] for this pt.  No Ep procedure will be performed at

## 2021-11-18 NOTE — PROGRESS NOTE ADULT - SUBJECTIVE AND OBJECTIVE BOX
Date of service: 21 @ 09:39    Lying in bed in NAD  Has left scrotal swelling  Has irregular heart rate  Feels anxious    ROS: no fever or chills; denies dizziness, no HA, no SOB or cough, no abdominal pain, no diarrhea or constipation; has dysuria    MEDICATIONS  (STANDING):  apixaban 5 milliGRAM(s) Oral every 12 hours  atorvastatin 20 milliGRAM(s) Oral at bedtime  cefTRIAXone Injectable. 2000 milliGRAM(s) IV Push every 24 hours  diltiazem Infusion 10 mG/Hr (10 mL/Hr) IV Continuous <Continuous>  doxycycline hyclate Capsule 100 milliGRAM(s) Oral every 12 hours  finasteride 5 milliGRAM(s) Oral daily  influenza  Vaccine (HIGH DOSE) 0.7 milliLiter(s) IntraMuscular once  loratadine 10 milliGRAM(s) Oral daily  nicotine - 21 mG/24Hr(s) Patch 1 Patch Transdermal daily  sodium chloride 0.9%. 1000 milliLiter(s) (80 mL/Hr) IV Continuous <Continuous>  tamsulosin 0.4 milliGRAM(s) Oral at bedtime    Vital Signs Last 24 Hrs  T(C): 36.7 (2021 08:25), Max: 36.8 (2021 20:38)  T(F): 98.1 (2021 08:25), Max: 98.3 (2021 20:38)  HR: 124 (2021 08:25) (80 - 124)  BP: 97/80 (2021 08:25) (97/80 - 115/89)  BP(mean): --  RR: 20 (2021 08:25) (20 - 20)  SpO2: 97% (2021 08:25) (96% - 97%)     Physical exam:    Constitutional:  No acute distress  HEENT: NC/AT, EOMI, PERRLA, conjunctivae clear; ears and nose atraumatic  Neck: supple; thyroid not palpable  Back: no tenderness  Respiratory: respiratory effort normal; clear to auscultation  Cardiovascular: S1S2 regular, no murmurs  Abdomen: soft, not tender, not distended, positive BS  Genitourinary: no suprapubic tenderness  Left testicular swelling and tenderness  Lymphatic: no LN palpable  Musculoskeletal: no muscle tenderness, no joint swelling or tenderness  Extremities: no pedal edema  Neurological/ Psychiatric: AxOx3, judgement and insight normal; moving all extremities  Skin: no rashes; no palpable lesions    Labs: reviewed                        15.0   6.89  )-----------( 171      ( 2021 07:45 )             43.8     11-18    137  |  109<H>  |  13  ----------------------------<  101<H>  3.7   |  21<L>  |  0.88    Ca    8.4<L>      2021 07:45    TPro  6.8  /  Alb  2.9<L>  /  TBili  0.7  /  DBili  x   /  AST  16  /  ALT  18  /  AlkPhos  50                          15.2   7.06  )-----------( 150      ( 2021 07:41 )             45.2     11-17    138  |  110<H>  |  13  ----------------------------<  102<H>  4.2   |  22  |  1.04    Ca    8.1<L>      2021 07:41    TPro  6.8  /  Alb  2.9<L>  /  TBili  0.7  /  DBili  x   /  AST  16  /  ALT  18  /  AlkPhos  50  11-17     LIVER FUNCTIONS - ( 2021 07:41 )  Alb: 2.9 g/dL / Pro: 6.8 gm/dL / ALK PHOS: 50 U/L / ALT: 18 U/L / AST: 16 U/L / GGT: x           Urinalysis Basic - ( 2021 13:23 )    Color: Yellow / Appearance: Slightly Turbid / S.015 / pH: x  Gluc: x / Ketone: Negative  / Bili: Negative / Urobili: Negative mg/dL   Blood: x / Protein: 15 mg/dL / Nitrite: Positive   Leuk Esterase: Moderate / RBC: 25-50 /HPF / WBC >50   Sq Epi: x / Non Sq Epi: Moderate / Bacteria: Many    COVID-19 PCR: Luis Armando (21 @ 13:24)      Culture - Blood (collected 2021 13:30)  Source: .Blood Blood-Peripheral  Preliminary Report (2021 19:02):    No growth to date.    Culture - Urine (collected 2021 13:23)  Source: Clean Catch Clean Catch (Midstream)  Preliminary Report (2021 07:50):    50,000 - 99,000 CFU/mL Gram Negative Rods    Culture - Blood (collected 2021 13:23)  Source: .Blood Blood-Peripheral  Preliminary Report (2021 19:02):    No growth to date.    Radiology: all available radiological tests reviewed    < from: US Testicles (21 @ 14:26) >  Left testis: 3.8 cm x 4.9 cm x 2.0 cm. Normal echogenicity and echotexture with no masses or areas of architectural distortion. Normal arterial and venous blood flow pattern.  Left epididymis: Hypervascularity in the body and tail with enlargement.  < end of copied text >    < from: Xray Chest 1 View-PORTABLE IMMEDIATE (21 @ 13:46) >  IMPRESSION: COPD.  < end of copied text >      Advanced directives addressed: full resuscitation

## 2021-11-18 NOTE — PROGRESS NOTE ADULT - SUBJECTIVE AND OBJECTIVE BOX
71M w/PMH BPH, HLD, HTN, presents c/o enlarged and "hard" left testicle w/ mild pain over past 2 days. He denies prior similar episode, denies fever/chills, dysuria.  In ED, he was incidentally found to be in atrial flutter in rvr initially, controlled after IVP of dilt. He denies any cp, sob, palpitations or h/o afib/flutter.  He endorses smoking 1.5ppd x50+yrs, denies dx of copd.  Wife at bedside and wants to be updated regarding pt's care daily. At time of my exam, pt states his testicles seem to be back to normal after receiving abx.       21:  EP asked to see patient for new onset atrial flutter with RVR. Currently rates controlled on diltiazem drip at 5mg/hr. Pt denies any symptoms of palpitations, SOB, CP, dizziness or lightheadedness.  Even during RVR's pt had no symptoms.    :  Pt in At Flutter with RVR    PAST MEDICAL & SURGICAL HISTORY:  BPH  HLD (hyperlipidemia)  HTN  No significant past surgical history    Social History:   lives w/ wife  ind ADLs  +smokes 1.5ppd x 50yrs  +etoh on weekends    FAMILY HISTORY:  Father () h/o heart disease  Brother has h/o HD      Review of Systems:   CONSTITUTIONAL: No fever.  EYES: No eye pain or discharge.  ENMT:  No sinus or throat pain  NECK: No pain or stiffness  RESPIRATORY: No cough, wheezing, chills or hemoptysis; No shortness of breath  CARDIOVASCULAR: No chest pain, palpitations, dizziness, or leg swelling  GASTROINTESTINAL: No abdominal or epigastric pain. No nausea, vomiting, or hematemesis; No diarrhea or constipation. No melena or hematochezia.  GENITOURINARY: No dysuria or incontinence, see hpi ++  NEUROLOGICAL: No headaches, memory loss, loss of strength, numbness, or tremors  SKIN: No rashes.  MUSCULOSKELETAL: No joint pain or swelling; No muscle, back, or extremity pain  PSYCHIATRIC: No depression, anxiety, mood swings, or difficulty sleeping    Allergies  No Known Allergies      Home Medications:  atorvastatin 20 mg oral tablet: 1 tab(s) orally once a day (in the morning) (2021 15:34)  Azithromycin 5 Day Dose Pack 250 mg oral tablet: Take 2 tablets by mouth on day 1, then 1 tablet daily on days 2-5  ***Course completed on 10/30/21*** (2021 15:34)  finasteride 5 mg oral tablet: 1 tab(s) orally once a day (in the evening) (2021 15:34)  levocetirizine 5 mg oral tablet: 1 tab(s) orally once a day (in the morning) (2021 15:34)  Pfizer-BioNTech COVID-19 Vaccine PF 30 mcg/0.3 mL intramuscular suspension: 0.3 milliliter(s) intramuscular once  ***3rd dose given in 2021*** (2021 15:34)  predniSONE 20 mg oral tablet: 1 tab(s) orally 2 times a day for 7 days  ***course completed on 21*** (2021 15:34)  tamsulosin 0.4 mg oral capsule: 1 cap(s) orally once a day (at bedtime) (2021 15:34)      MEDICATIONS  (STANDING):  atorvastatin 20 milliGRAM(s) Oral at bedtime  enoxaparin Injectable 90 milliGRAM(s) SubCutaneous two times a day  finasteride 5 milliGRAM(s) Oral daily  loratadine 10 milliGRAM(s) Oral daily  nicotine - 21 mG/24Hr(s) Patch 1 Patch Transdermal daily  sodium chloride 0.9%. 1000 milliLiter(s) (80 mL/Hr) IV Continuous <Continuous>  tamsulosin 0.4 milliGRAM(s) Oral at bedtime    MEDICATIONS  (PRN):  nicotine  Polacrilex Gum 2 milliGRAM(s) Oral every 2 hours PRN breakthrough cravings      PHYSICAL EXAM:  Vital Signs Last 24 Hrs  T(C): 36.4 (2021 21:28), Max: 37.3 (2021 13:25)  T(F): 97.6 (:28), Max: 99.1 (2021 13:25)  HR: 70 (2021 21:28) (70 - 160)  BP: 102/86 (2021 21:28) (96/81 - 115/92)  BP(mean): 86 (2021 12:53) (86 - 86)  RR: 19 (2021 21:28) (17 - 21)  SpO2: 96% (2021 21:28) (95% - 98%)    GENERAL: NAD, anxious  HEAD:  Atraumatic, Normocephalic  EYES: EOMI, PERRLA, conjunctiva and sclera clear  ENT: normal hearing, no nasal discharge, throat clear, dentition normal  NECK: Supple, No JVD, no LAD, no thyromegaly   CHEST/LUNG: Clear to auscultation bilaterally; No wheeze, respirations unlabored  HEART: Regular rate and rhythm; No murmurs, rubs, or gallops  ABDOMEN: Soft, Nontender, Nondistended; Bowel sounds present, no HSM  : +testicles symmetric, no TTP, +mobile, no erythema  EXTREMITIES:  2+ Peripheral Pulses, No clubbing, cyanosis, or edema  PSYCH: AAOx3, normal behavior  NEUROLOGY: non-focal, sensory and cn 2-12 intact, speech/language intact  SKIN: No visible rashes or lesions    LABS:                        16.4   8.64  )-----------( 184      ( 2021 13:23 )             48.6               TELE: At flutter with rates 150 since this am    MEDICATIONS  (STANDING):  apixaban 5 milliGRAM(s) Oral every 12 hours  atorvastatin 20 milliGRAM(s) Oral at bedtime  cefTRIAXone Injectable. 2000 milliGRAM(s) IV Push every 24 hours  diltiazem Infusion 10 mG/Hr (10 mL/Hr) IV Continuous <Continuous>  doxycycline hyclate Capsule 100 milliGRAM(s) Oral every 12 hours  finasteride 5 milliGRAM(s) Oral daily  influenza  Vaccine (HIGH DOSE) 0.7 milliLiter(s) IntraMuscular once  loratadine 10 milliGRAM(s) Oral daily  nicotine - 21 mG/24Hr(s) Patch 1 Patch Transdermal daily  sodium chloride 0.9%. 1000 milliLiter(s) (80 mL/Hr) IV Continuous <Continuous>  tamsulosin 0.4 milliGRAM(s) Oral at bedtime    MEDICATIONS  (PRN):  nicotine  Polacrilex Gum 2 milliGRAM(s) Oral every 2 hours PRN breakthrough cravings      Allergies    No Known Allergies    Intolerances                  LABS:                        15.0   6.89  )-----------( 171      ( 2021 07:45 )             43.8     11-18    137  |  109<H>  |  13  ----------------------------<  101<H>  3.7   |  21<L>  |  0.88    Ca    8.4<L>      2021 07:45    TPro  6.8  /  Alb  2.9<L>  /  TBili  0.7  /  DBili  x   /  AST  16  /  ALT  18  /  AlkPhos  50  11-17    PT/INR - ( 2021 13:23 )   PT: 13.4 sec;   INR: 1.15 ratio         PTT - ( 2021 13:23 )  PTT:34.1 sec  Urinalysis Basic - ( 2021 13:23 )    Color: Yellow / Appearance: Slightly Turbid / S.015 / pH: x  Gluc: x / Ketone: Negative  / Bili: Negative / Urobili: Negative mg/dL   Blood: x / Protein: 15 mg/dL / Nitrite: Positive   Leuk Esterase: Moderate / RBC: 25-50 /HPF / WBC >50   Sq Epi: x / Non Sq Epi: Moderate / Bacteria: Many            RADIOLOGY & ADDITIONAL TESTS:    < from: TTE Echo Complete w/o Contrast w/ Doppler (21 @ 13:58) >  mpression     Summary     Normal appearing mitral valve structure.   Trace mitral regurgitation is present.   Normal aortic valve structure and function.   The tricuspid valve leaflets are thin and pliable; valve motion is normal.   Trace tricuspid valve regurgitation is present.   Normal appearing pulmonic valve structure.  Trace pulmonic valvular regurgitation is present.   Normal appearing left atrium.   Left ventricle systolic function appears preserved in the presence of a   cardiac arrhythmia. Left ventricle size and structure are within normal   limitations. Visual estimation of left ventricle ejection fraction is   60-65%.     Signature    < end of copied text >

## 2021-11-18 NOTE — PROGRESS NOTE ADULT - ASSESSMENT
71 year old male admitted with epidiymis, ID is following this pt.   EP has been consulted for management of  At Flutter with RVR-he has been on Eliquis.  LVEF is 55% (11/17/21-Echo)  PMH BPH, HLD, HTN,  Pt to have  ablation in the am  Consent obtained for ablation. Pt is competent, has capacity, and understands risks and benefits of procedure. Risks and benefits discussed.   Risk discussed included, but not limited to MI, stroke, mortality, major bleeding, arrythmia, or infection. All questions answered .  Carto requested for this procedure        NPO after midnight   Lopressor IV q 6 hours for heart rate greater than 130 bpm   Type and screen for this pt  Continue with Eliquis   Continue with Cardizem drip  Continue with ID and recommendations for infections control.  Covid swab was obtained within 72 of this procedure scheduled for am     71 year old male admitted with epidiymis, ID is following this pt.   EP has been consulted for management of  At Flutter with RVR-he has been on Eliquis.  LVEF is 55% (11/17/21-Echo)  PMH BPH, HLD, HTN,  Pt to have  ablation in the am  Consent obtained for ablation. Pt is competent, has capacity.        NPO after midnight   Lopressor IV q 6 hours for heart rate greater than 130 bpm   Type and screen for this pt  Continue with Eliquis   Continue with Cardizem drip  Continue with ID and recommendations for infections control.  Covid swab was obtained within 72 of this procedure scheduled for am    Addendum:  please see chart note to follow in regards to patients prefernce     71 year old male admitted with epidiymis, ID is following this pt.   EP has been consulted for management of  At Flutter with RVR-he has been on Eliquis.  LVEF is 55% (11/17/21-Echo)  PMH BPH, HLD, HTN,  Pt to have JEANETTE followed by atrial flutter ablation in the am  Consent obtained for ablation. Pt is competent, has capacity.        NPO after midnight   Lopressor IV q 6 hours for heart rate greater than 130 bpm   Type and screen for this pt  Continue with Eliquis   Continue with Cardizem drip  Continue with ID and recommendations for infections control.  Covid swab was obtained within 72 of this procedure scheduled for am    Addendum:  please see chart note to follow in regards to patients prefernce

## 2021-11-18 NOTE — PROGRESS NOTE ADULT - SUBJECTIVE AND OBJECTIVE BOX
Reason for Admission: aflutter  History of Present Illness:   HPI:  71M w/PMH BPH, HLD, HTN, presents c/o enlarged and "hard" left testicle w/ mild pain over past 2 days. He denies prior similar episode, denies fever/chills, dysuria.  In ED, he was incidentally found to be in atrial flutter in rvr initially, controlled after IVP of dilt. He denies any cp, sob, palpitations or h/o afib/flutter.  He endorses smoking 1.5ppd x50+yrs, denies dx of copd.  Wife at bedside and wants to be updated regarding pt's care daily. At time of my exam, pt states his testicles seem to be back to normal after receiving abx.     Medical progress: Poor HR control still in Atrial flutter with RVR. Patient is anxious. I reached out to Dr. Fu -  whose office is noting that patient has not been seen in the past in their office and follows up with dr. Edmond. Patient's wife will communicate with their office.   Complaints: no new complaints  State of mind: normal conversation    REVIEW OF SYSTEMS:  General: NAD, hemodynamically stable   HEENT:  Eyes:  No visual loss   SKIN:  No rash or itching.  CARDIOVASCULAR:  No chest pain   RESPIRATORY:  No shortness of breath   GASTROINTESTINAL:  No anorexia, nausea   NEUROLOGICAL:  No headache, dizziness, syncope, paralysis, ataxia, numbness or tingling in the extremities. No change in bowel or bladder control.  MUSCULOSKELETAL:  No muscle, back pain, joint pain or stiffness.  HEMATOLOGIC:  No anemia, bleeding or bruising.  LYMPHATICS:  No enlarged nodes. No history of splenectomy.  ENDOCRINOLOGIC:  No reports of sweating, cold or heat intolerance. No polyuria or polydipsia.  ALLERGIES:  No history of asthma, hives, eczema or rhinitis.    Physical Exam:   GENERAL APPEARANCE:  NAD, hemodynamically stable  T(C): 36.7 (18 Nov 2021 08:25), Max: 36.8 (17 Nov 2021 20:38)  T(F): 98.1 (18 Nov 2021 08:25), Max: 98.3 (17 Nov 2021 20:38)  HR: 155 (18 Nov 2021 12:05) (80 - 155)  BP: 117/67 (18 Nov 2021 11:57) (97/80 - 117/67)  RR: 20 (18 Nov 2021 08:25) (20 - 20)  SpO2: 97% (18 Nov 2021 08:25) (96% - 97%)  HEENT:  Head is normocephalic    Skin:  Warm and dry without any rash   NECK:  Supple without lymphadenopathy.   HEART:  Regular rate and rhythm. normal S1 and S2, No M/R/G  LUNGS:  Good ins/exp effort, no W/R/R/C  ABDOMEN:  Soft, nontender, nondistended with good bowel sounds heard  EXTREMITIES:  Without cyanosis, clubbing or edema.   NEUROLOGICAL:  Gross nonfocal     Pt is a 71M w/PMH BPH, HLD, HTN, presents c/o enlarged and "hard" left testicle w/ mild pain over past 2 days.   +smoker. In ED, US scrotum : Left-sided epididymitis,  given IV levaquin, +UA, EKG a-flutter, given IVP dilt, HR 80s at time of my exam. bp stable.   Cxr: COPD      # Epididymitis  # UTI  - Rocephin 2g day # 2  - Doxy 100 mg day # 2  - care discussed with Dr. Graham    # New onset atrial flutter with RVR in setting of infection  - tele monitor -  still poorly controlled HR  - Continue Cardizem drip, titrate down as tolerated, eventual transition to PO  - lopressor 5 mg IV q6h PRN  - Continue Eliquis  - CHADSVASC - 2  - Patient has cardiologist at [place] and may want to have procedures performed there.    # tobacco use d/o  -  for cessation  - sweta patch and prn sweta gum     #PPX  - for dvt- on lovenox as above.        Reason for Admission: aflutter  History of Present Illness:   HPI:  71M w/PMH BPH, HLD, HTN, presents c/o enlarged and "hard" left testicle w/ mild pain over past 2 days. He denies prior similar episode, denies fever/chills, dysuria.  In ED, he was incidentally found to be in atrial flutter in rvr initially, controlled after IVP of dilt. He denies any cp, sob, palpitations or h/o afib/flutter.  He endorses smoking 1.5ppd x50+yrs, denies dx of copd.  Wife at bedside and wants to be updated regarding pt's care daily. At time of my exam, pt states his testicles seem to be back to normal after receiving abx.     Medical progress: Poor HR control still in Atrial flutter with RVR. Patient is anxious. I reached out to Dr. Fu -  whose office is noting that patient has not been seen in the past in their office and follows up with dr. Edmond. Patient's wife will communicate with their office.   Complaints: no new complaints  State of mind: normal conversation    REVIEW OF SYSTEMS:  General: NAD, hemodynamically stable   HEENT:  Eyes:  No visual loss   SKIN:  No rash or itching.  CARDIOVASCULAR:  No chest pain   RESPIRATORY:  No shortness of breath   GASTROINTESTINAL:  No anorexia, nausea   NEUROLOGICAL:  No headache, dizziness, syncope, paralysis, ataxia, numbness or tingling in the extremities. No change in bowel or bladder control.  MUSCULOSKELETAL:  No muscle, back pain, joint pain or stiffness.  HEMATOLOGIC:  No anemia, bleeding or bruising.  LYMPHATICS:  No enlarged nodes. No history of splenectomy.  ENDOCRINOLOGIC:  No reports of sweating, cold or heat intolerance. No polyuria or polydipsia.  ALLERGIES:  No history of asthma, hives, eczema or rhinitis.    Physical Exam:   GENERAL APPEARANCE:  NAD, hemodynamically stable  T(C): 36.7 (18 Nov 2021 08:25), Max: 36.8 (17 Nov 2021 20:38)  T(F): 98.1 (18 Nov 2021 08:25), Max: 98.3 (17 Nov 2021 20:38)  HR: 155 (18 Nov 2021 12:05) (80 - 155)  BP: 117/67 (18 Nov 2021 11:57) (97/80 - 117/67)  RR: 20 (18 Nov 2021 08:25) (20 - 20)  SpO2: 97% (18 Nov 2021 08:25) (96% - 97%)  HEENT:  Head is normocephalic    Skin:  Warm and dry without any rash   NECK:  Supple without lymphadenopathy.   HEART:  Regular rate and rhythm. normal S1 and S2, No M/R/G  LUNGS:  Good ins/exp effort, no W/R/R/C  ABDOMEN:  Soft, nontender, nondistended with good bowel sounds heard  EXTREMITIES:  Without cyanosis, clubbing or edema.   NEUROLOGICAL:  Gross nonfocal     Pt is a 71M w/PMH BPH, HLD, HTN, presents c/o enlarged and "hard" left testicle w/ mild pain over past 2 days.   +smoker. In ED, US scrotum : Left-sided epididymitis,  given IV levaquin, +UA, EKG a-flutter, given IVP dilt, HR 80s at time of my exam. bp stable.   Cxr: COPD      # Epididymitis  # UTI  - Rocephin 2g day # 2  - Doxy 100 mg day # 2  - ECHO: Left testis: 3.8 cm x 4.9 cm x 2.0 cm. Normal echogenicity and echotexture with no masses or areas of architectural distortion. Normal arterial and venous blood flow pattern.  Left epididymis: Hypervascularity in the body and tail with enlargement.  - care discussed with Dr. Graham    # New onset atrial flutter with RVR in setting of infection  - tele monitor -  still poorly controlled HR  - Continue Cardizem drip, titrate down as tolerated, eventual transition to PO  - lopressor 5 mg IV q6h PRN  - Continue Eliquis  - CHADSVASC - 2  - Patient has cardiologist at [place] and may want to have procedures performed there.    # tobacco use d/o  -  for cessation  - sweta patch and prn sweta gum     #PPX  - for dvt- on lovenox as above.

## 2021-11-19 ENCOUNTER — TRANSCRIPTION ENCOUNTER (OUTPATIENT)
Age: 71
End: 2021-11-19

## 2021-11-19 VITALS
DIASTOLIC BLOOD PRESSURE: 81 MMHG | OXYGEN SATURATION: 96 % | RESPIRATION RATE: 18 BRPM | HEART RATE: 82 BPM | TEMPERATURE: 98 F | SYSTOLIC BLOOD PRESSURE: 106 MMHG

## 2021-11-19 PROBLEM — Z87.438 PERSONAL HISTORY OF OTHER DISEASES OF MALE GENITAL ORGANS: Chronic | Status: ACTIVE | Noted: 2021-11-16

## 2021-11-19 PROBLEM — E78.5 HYPERLIPIDEMIA, UNSPECIFIED: Chronic | Status: ACTIVE | Noted: 2021-11-16

## 2021-11-19 LAB
-  AMIKACIN: SIGNIFICANT CHANGE UP
-  AMOXICILLIN/CLAVULANIC ACID: SIGNIFICANT CHANGE UP
-  AMPICILLIN/SULBACTAM: SIGNIFICANT CHANGE UP
-  AMPICILLIN: SIGNIFICANT CHANGE UP
-  AZTREONAM: SIGNIFICANT CHANGE UP
-  CEFAZOLIN: SIGNIFICANT CHANGE UP
-  CEFEPIME: SIGNIFICANT CHANGE UP
-  CEFOXITIN: SIGNIFICANT CHANGE UP
-  CEFTRIAXONE: SIGNIFICANT CHANGE UP
-  CIPROFLOXACIN: SIGNIFICANT CHANGE UP
-  ERTAPENEM: SIGNIFICANT CHANGE UP
-  GENTAMICIN: SIGNIFICANT CHANGE UP
-  IMIPENEM: SIGNIFICANT CHANGE UP
-  LEVOFLOXACIN: SIGNIFICANT CHANGE UP
-  MEROPENEM: SIGNIFICANT CHANGE UP
-  NITROFURANTOIN: SIGNIFICANT CHANGE UP
-  PIPERACILLIN/TAZOBACTAM: SIGNIFICANT CHANGE UP
-  TIGECYCLINE: SIGNIFICANT CHANGE UP
-  TOBRAMYCIN: SIGNIFICANT CHANGE UP
-  TRIMETHOPRIM/SULFAMETHOXAZOLE: SIGNIFICANT CHANGE UP
ANION GAP SERPL CALC-SCNC: 5 MMOL/L — SIGNIFICANT CHANGE UP (ref 5–17)
BUN SERPL-MCNC: 12 MG/DL — SIGNIFICANT CHANGE UP (ref 7–23)
CALCIUM SERPL-MCNC: 8.2 MG/DL — LOW (ref 8.5–10.1)
CHLORIDE SERPL-SCNC: 112 MMOL/L — HIGH (ref 96–108)
CO2 SERPL-SCNC: 22 MMOL/L — SIGNIFICANT CHANGE UP (ref 22–31)
CREAT SERPL-MCNC: 1.01 MG/DL — SIGNIFICANT CHANGE UP (ref 0.5–1.3)
CULTURE RESULTS: SIGNIFICANT CHANGE UP
GLUCOSE SERPL-MCNC: 105 MG/DL — HIGH (ref 70–99)
HCT VFR BLD CALC: 43 % — SIGNIFICANT CHANGE UP (ref 39–50)
HCT VFR BLD CALC: 44.2 % — SIGNIFICANT CHANGE UP (ref 39–50)
HGB BLD-MCNC: 14.5 G/DL — SIGNIFICANT CHANGE UP (ref 13–17)
HGB BLD-MCNC: 14.9 G/DL — SIGNIFICANT CHANGE UP (ref 13–17)
MCHC RBC-ENTMCNC: 32.2 PG — SIGNIFICANT CHANGE UP (ref 27–34)
MCHC RBC-ENTMCNC: 32.3 PG — SIGNIFICANT CHANGE UP (ref 27–34)
MCHC RBC-ENTMCNC: 33.7 GM/DL — SIGNIFICANT CHANGE UP (ref 32–36)
MCHC RBC-ENTMCNC: 33.7 GM/DL — SIGNIFICANT CHANGE UP (ref 32–36)
MCV RBC AUTO: 95.6 FL — SIGNIFICANT CHANGE UP (ref 80–100)
MCV RBC AUTO: 95.9 FL — SIGNIFICANT CHANGE UP (ref 80–100)
METHOD TYPE: SIGNIFICANT CHANGE UP
ORGANISM # SPEC MICROSCOPIC CNT: SIGNIFICANT CHANGE UP
ORGANISM # SPEC MICROSCOPIC CNT: SIGNIFICANT CHANGE UP
PLATELET # BLD AUTO: 164 K/UL — SIGNIFICANT CHANGE UP (ref 150–400)
PLATELET # BLD AUTO: 173 K/UL — SIGNIFICANT CHANGE UP (ref 150–400)
POTASSIUM SERPL-MCNC: 4 MMOL/L — SIGNIFICANT CHANGE UP (ref 3.5–5.3)
POTASSIUM SERPL-SCNC: 4 MMOL/L — SIGNIFICANT CHANGE UP (ref 3.5–5.3)
RBC # BLD: 4.5 M/UL — SIGNIFICANT CHANGE UP (ref 4.2–5.8)
RBC # BLD: 4.61 M/UL — SIGNIFICANT CHANGE UP (ref 4.2–5.8)
RBC # FLD: 12.5 % — SIGNIFICANT CHANGE UP (ref 10.3–14.5)
RBC # FLD: 12.7 % — SIGNIFICANT CHANGE UP (ref 10.3–14.5)
SARS-COV-2 RNA SPEC QL NAA+PROBE: SIGNIFICANT CHANGE UP
SODIUM SERPL-SCNC: 139 MMOL/L — SIGNIFICANT CHANGE UP (ref 135–145)
SPECIMEN SOURCE: SIGNIFICANT CHANGE UP
TSH SERPL-MCNC: 1.12 UU/ML — SIGNIFICANT CHANGE UP (ref 0.34–4.82)
WBC # BLD: 6.73 K/UL — SIGNIFICANT CHANGE UP (ref 3.8–10.5)
WBC # BLD: 7.15 K/UL — SIGNIFICANT CHANGE UP (ref 3.8–10.5)
WBC # FLD AUTO: 6.73 K/UL — SIGNIFICANT CHANGE UP (ref 3.8–10.5)
WBC # FLD AUTO: 7.15 K/UL — SIGNIFICANT CHANGE UP (ref 3.8–10.5)

## 2021-11-19 PROCEDURE — 99239 HOSP IP/OBS DSCHRG MGMT >30: CPT

## 2021-11-19 PROCEDURE — 99232 SBSQ HOSP IP/OBS MODERATE 35: CPT

## 2021-11-19 RX ORDER — SODIUM CHLORIDE 9 MG/ML
1000 INJECTION INTRAMUSCULAR; INTRAVENOUS; SUBCUTANEOUS ONCE
Refills: 0 | Status: COMPLETED | OUTPATIENT
Start: 2021-11-19 | End: 2021-11-19

## 2021-11-19 RX ORDER — CEFUROXIME AXETIL 250 MG
1 TABLET ORAL
Qty: 42 | Refills: 0
Start: 2021-11-19 | End: 2021-12-09

## 2021-11-19 RX ORDER — APIXABAN 2.5 MG/1
1 TABLET, FILM COATED ORAL
Qty: 60 | Refills: 0
Start: 2021-11-19 | End: 2021-12-18

## 2021-11-19 RX ORDER — METOPROLOL TARTRATE 50 MG
1 TABLET ORAL
Qty: 80 | Refills: 0
Start: 2021-11-19 | End: 2021-12-08

## 2021-11-19 RX ORDER — BNT162B2 0.23 MG/2.25ML
0.3 INJECTION, SUSPENSION INTRAMUSCULAR
Qty: 0 | Refills: 0 | DISCHARGE

## 2021-11-19 RX ADMIN — CEFTRIAXONE 2000 MILLIGRAM(S): 500 INJECTION, POWDER, FOR SOLUTION INTRAMUSCULAR; INTRAVENOUS at 10:20

## 2021-11-19 RX ADMIN — LORATADINE 10 MILLIGRAM(S): 10 TABLET ORAL at 10:20

## 2021-11-19 RX ADMIN — Medication 50 MILLIGRAM(S): at 18:13

## 2021-11-19 RX ADMIN — Medication 50 MILLIGRAM(S): at 10:21

## 2021-11-19 RX ADMIN — Medication 1 PATCH: at 10:21

## 2021-11-19 RX ADMIN — SODIUM CHLORIDE 500 MILLILITER(S): 9 INJECTION INTRAMUSCULAR; INTRAVENOUS; SUBCUTANEOUS at 03:54

## 2021-11-19 RX ADMIN — APIXABAN 5 MILLIGRAM(S): 2.5 TABLET, FILM COATED ORAL at 10:20

## 2021-11-19 RX ADMIN — FINASTERIDE 5 MILLIGRAM(S): 5 TABLET, FILM COATED ORAL at 10:20

## 2021-11-19 RX ADMIN — Medication 1 PATCH: at 10:20

## 2021-11-19 RX ADMIN — Medication 100 MILLIGRAM(S): at 06:18

## 2021-11-19 NOTE — DISCHARGE NOTE PROVIDER - NSDCCPCAREPLAN_GEN_ALL_CORE_FT
PRINCIPAL DISCHARGE DIAGNOSIS  Diagnosis: Atrial flutter  Assessment and Plan of Treatment: Continue tele monitoring  Continue beta blocker PO  Continue Eliquis  CHADSVASC=2  Remains on IV antibiotics per ID.  Keep K>4, Mg>2  Normal LV function EF 60-65%  Pt to have outpatient atrial flutter ablation in next 1-2 weeks  F/U with Dr. Guerra as outpatient.        SECONDARY DISCHARGE DIAGNOSES  Diagnosis: Left epididymitis  Assessment and Plan of Treatment: - ceftin 500 q12h total of 21 days  - follow up with Dr. Banks  - you have been seen by Dr. Graham

## 2021-11-19 NOTE — DISCHARGE NOTE NURSING/CASE MANAGEMENT/SOCIAL WORK - NSDCPEWEB_GEN_ALL_CORE
[facility] for Tobacco Control website --- [email or web address] --- [email or web address]

## 2021-11-19 NOTE — PROGRESS NOTE ADULT - PROVIDER SPECIALTY LIST ADULT
Electrophysiology
Hospitalist
Infectious Disease
Electrophysiology
Infectious Disease

## 2021-11-19 NOTE — PROGRESS NOTE ADULT - ASSESSMENT
This is a 71 yr old male with above PMHx who presented to the ED c/o enlarged and hardened left testicle and was found to be in atrial flutter with RVR.  Unknown length of time in AF since he has no symptoms.  CHADSVASC=2 (HTN, Age).  He was started on full dose Lovenox and was changed to PO Eliquis today.  He remains on diltiazem drip for rate control.  2d echo pending to assess LV function.  Has not seen his cardiologist in many years, goes to [place].  He is also on IV antibiotics per ID.    A/P: new onset atrial flutter with RVR now in SR  Continue tele monitoring  Continue beta blocker PO  Continue Eliquis  CHADSVASC=2  Remains on IV antibiotics per ID.  Keep K>4, Mg>2  Normal LV function EF 60-65%  Pt to have outpatient atrial flutter ablation in next 1-2 weeks  F/U with Dr. Guerra as outpatient.

## 2021-11-19 NOTE — DISCHARGE NOTE PROVIDER - CARE PROVIDER_API CALL
Paola Guerra)  Cardiac Electrophysiology; Cardiovascular Disease; Internal Medicine  [address] [place] [address]  Phone: [phone]  Fax: [phone]  Follow Up Time:

## 2021-11-19 NOTE — DISCHARGE NOTE NURSING/CASE MANAGEMENT/SOCIAL WORK - PATIENT PORTAL LINK FT
You can access the FollowMyHealth Patient Portal offered by [facility] by registering at the following website: [email or web address]. By joining [employer]’s FollowMyHealth portal, you will also be able to view your health information using other applications (apps) compatible with our system.

## 2021-11-19 NOTE — PROGRESS NOTE ADULT - ATTENDING COMMENTS
typical atrial flutter converted to SDR, continue eliquis metoprolol follow as outpatient
pt with typical atrial flutter RVR on eliquis will benefit from RF catheter ablation for afl scheduled for tomorrow

## 2021-11-19 NOTE — PROGRESS NOTE ADULT - SUBJECTIVE AND OBJECTIVE BOX
Date of service: 21 @ 10:41    Lying in bed in NAD  Scrotum feels less swollen  Has palpitations  No fever    ROS: no fever or chills; denies dizziness, no HA, no SOB or cough, no abdominal pain, no diarrhea or constipation; no dysuria, no legs pain, no rashes    MEDICATIONS  (STANDING):  apixaban 5 milliGRAM(s) Oral every 12 hours  atorvastatin 20 milliGRAM(s) Oral at bedtime  cefTRIAXone Injectable. 2000 milliGRAM(s) IV Push every 24 hours  doxycycline hyclate Capsule 100 milliGRAM(s) Oral every 12 hours  finasteride 5 milliGRAM(s) Oral daily  influenza  Vaccine (HIGH DOSE) 0.7 milliLiter(s) IntraMuscular once  loratadine 10 milliGRAM(s) Oral daily  metoprolol tartrate 50 milliGRAM(s) Oral every 6 hours  nicotine - 21 mG/24Hr(s) Patch 1 Patch Transdermal daily  tamsulosin 0.4 milliGRAM(s) Oral at bedtime    Vital Signs Last 24 Hrs  T(C): 36.9 (2021 08:24), Max: 36.9 (2021 15:59)  T(F): 98.4 (2021 08:24), Max: 98.4 (2021 15:59)  HR: 88 (2021 10:19) (70 - 155)  BP: 114/81 (2021 10:19) (87/41 - 121/95)  BP(mean): 53 (2021 03:00) (53 - 53)  RR: 18 (2021 08:24) (18 - 18)  SpO2: 100% (2021 08:24) (100% - 100%)     Physical exam:    Constitutional:  No acute distress  HEENT: NC/AT, EOMI, PERRLA, conjunctivae clear; ears and nose atraumatic  Neck: supple; thyroid not palpable  Back: no tenderness  Respiratory: respiratory effort normal; clear to auscultation  Cardiovascular: S1S2 regular, no murmurs  Abdomen: soft, not tender, not distended, positive BS  Genitourinary: no suprapubic tenderness  Left testicular swelling and tenderness  Lymphatic: no LN palpable  Musculoskeletal: no muscle tenderness, no joint swelling or tenderness  Extremities: no pedal edema  Neurological/ Psychiatric: AxOx3, judgement and insight normal; moving all extremities  Skin: no rashes; no palpable lesions    Labs: reviewed                        15.0   6.89  )-----------( 171      ( 2021 07:45 )             43.8     11-18    137  |  109<H>  |  13  ----------------------------<  101<H>  3.7   |  21<L>  |  0.88    Ca    8.4<L>      2021 07:45    TPro  6.8  /  Alb  2.9<L>  /  TBili  0.7  /  DBili  x   /  AST  16  /  ALT  18  /  AlkPhos  50                          15.2   7.06  )-----------( 150      ( 2021 07:41 )             45.2     11-17    138  |  110<H>  |  13  ----------------------------<  102<H>  4.2   |  22  |  1.04    Ca    8.1<L>      2021 07:41    TPro  6.8  /  Alb  2.9<L>  /  TBili  0.7  /  DBili  x   /  AST  16  /  ALT  18  /  AlkPhos  50  11-17     LIVER FUNCTIONS - ( 2021 07:41 )  Alb: 2.9 g/dL / Pro: 6.8 gm/dL / ALK PHOS: 50 U/L / ALT: 18 U/L / AST: 16 U/L / GGT: x           Urinalysis Basic - ( 2021 13:23 )    Color: Yellow / Appearance: Slightly Turbid / S.015 / pH: x  Gluc: x / Ketone: Negative  / Bili: Negative / Urobili: Negative mg/dL   Blood: x / Protein: 15 mg/dL / Nitrite: Positive   Leuk Esterase: Moderate / RBC: 25-50 /HPF / WBC >50   Sq Epi: x / Non Sq Epi: Moderate / Bacteria: Many    COVID-19 PCR: NotDetec (21 @ 13:24)      Culture - Blood (collected 2021 13:30)  Source: .Blood Blood-Peripheral  Preliminary Report (2021 19:02):    No growth to date.    Culture - Urine (collected 2021 13:23)  Source: Clean Catch Clean Catch (Midstream)  Preliminary Report (2021 17:21):    50,000 - 99,000 CFU/mL Escherichia coli    Culture - Blood (collected 2021 13:23)  Source: .Blood Blood-Peripheral  Preliminary Report (2021 19:02):    No growth to date.    Radiology: all available radiological tests reviewed    < from: US Testicles (21 @ 14:26) >  Left testis: 3.8 cm x 4.9 cm x 2.0 cm. Normal echogenicity and echotexture with no masses or areas of architectural distortion. Normal arterial and venous blood flow pattern.  Left epididymis: Hypervascularity in the body and tail with enlargement.  < end of copied text >    < from: Xray Chest 1 View-PORTABLE IMMEDIATE (21 @ 13:46) >  IMPRESSION: COPD.  < end of copied text >      Advanced directives addressed: full resuscitation

## 2021-11-19 NOTE — PHARMACOTHERAPY INTERVENTION NOTE - COMMENTS
Medication reconciliation completed.  Reviewed Medication list and confirmed med allergies with patient; confirmed with Dr. First [place].
Recommended transitioning patient from Lovenox to Eliquis
Patient will be newly-starting Eliquis. Rx sent to pharmacy. Called pharmacy and they stated that it would be a $450 co-pay due to deductible. Provided a coupon for a free 30 day supply. Pt is aware and will follow up with cardiologist.
Patient will be newly starting Eliquis. Counseled on side effects including bruising, bleeding, and to avoid NSAIDs. Patient expressed understanding and all questions were answered.

## 2021-11-19 NOTE — DISCHARGE NOTE PROVIDER - NSDCMRMEDTOKEN_GEN_ALL_CORE_FT
apixaban 5 mg oral tablet: 1 tab(s) orally 2 times a day   atorvastatin 20 mg oral tablet: 1 tab(s) orally once a day (in the morning)  cefuroxime 500 mg oral tablet: 1 tab(s) orally 2 times a day   finasteride 5 mg oral tablet: 1 tab(s) orally once a day (in the evening)  levocetirizine 5 mg oral tablet: 1 tab(s) orally once a day (in the morning)  metoprolol tartrate 50 mg oral tablet: 1 tab(s) orally every 6 hours  tamsulosin 0.4 mg oral capsule: 1 cap(s) orally once a day (at bedtime)

## 2021-11-19 NOTE — DISCHARGE NOTE PROVIDER - NSDCQMERRANDS_GEN_ALL_CORE
Colorectal Cancer Screening: During our visit today, we discussed that it is recommended you receive colorectal cancer screening. Please call or make an appointment with your primary care provider to discuss this. You may also call the [facility] scheduling line ([phone]) to set up a colonoscopy appointment.     No

## 2021-11-19 NOTE — PROGRESS NOTE ADULT - SUBJECTIVE AND OBJECTIVE BOX
HPI:  71M w/PMH BPH, HLD, HTN, presents c/o enlarged and "hard" left testicle w/ mild pain over past 2 days. He denies prior similar episode, denies fever/chills, dysuria.  In ED, he was incidentally found to be in atrial flutter in rvr initially, controlled after IVP of dilt. He denies any cp, sob, palpitations or h/o afib/flutter.  He endorses smoking 1.5ppd x50+yrs, denies dx of copd.  Wife at bedside and wants to be updated regarding pt's care daily. At time of my exam, pt states his testicles seem to be back to normal after receiving abx.       21:  EP asked to see patient for new onset atrial flutter with RVR. Currently rates controlled on diltiazem drip at 5mg/hr. Pt denies any symptoms of palpitations, SOB, CP, dizziness or lightheadedness.  Even during RVR's pt had no symptoms.  TELE: Atrial flutter  bpm, earlier today rates 150-160 bpm  EK/16 2PM: atrial flutter 4:1 81 bpm    :  Pt in At Flutter with RVR    21: pt has converted to NSR around midnight last night  TELE: SR 70-80 bpm      MEDICATIONS  (STANDING):  apixaban 5 milliGRAM(s) Oral every 12 hours  atorvastatin 20 milliGRAM(s) Oral at bedtime  cefTRIAXone Injectable. 2000 milliGRAM(s) IV Push every 24 hours  doxycycline hyclate Capsule 100 milliGRAM(s) Oral every 12 hours  finasteride 5 milliGRAM(s) Oral daily  influenza  Vaccine (HIGH DOSE) 0.7 milliLiter(s) IntraMuscular once  loratadine 10 milliGRAM(s) Oral daily  metoprolol tartrate 50 milliGRAM(s) Oral every 6 hours  nicotine - 21 mG/24Hr(s) Patch 1 Patch Transdermal daily  tamsulosin 0.4 milliGRAM(s) Oral at bedtime    MEDICATIONS  (PRN):  nicotine  Polacrilex Gum 2 milliGRAM(s) Oral every 2 hours PRN breakthrough cravings    Allergies    No Known Allergies    REVIEW OF SYSTEMS:    CONSTITUTIONAL: No weakness, fevers or chills  EYES/ENT: No visual changes;  No vertigo or throat pain   NECK: No pain or stiffness  RESPIRATORY: No cough, wheezing, hemoptysis; No shortness of breath  CARDIOVASCULAR: No chest pain or palpitations  GASTROINTESTINAL: No abdominal or epigastric pain. No nausea, vomiting, or hematemesis; No diarrhea or constipation. No melena or hematochezia.  GENITOURINARY: No dysuria, frequency or hematuria  NEUROLOGICAL: No numbness or weakness  SKIN: No itching, burning, rashes, or lesions   All other review of systems is negative unless indicated above    Vital Signs Last 24 Hrs  T(C): 36.9 (2021 08:24), Max: 36.9 (2021 15:59)  T(F): 98.4 (2021 08:24), Max: 98.4 (2021 15:59)  HR: 88 (2021 10:19) (70 - 117)  BP: 114/81 (2021 10:19) (87/41 - 121/95)  BP(mean): 53 (2021 03:00) (53 - 53)  RR: 18 (2021 08:24) (18 - 18)  SpO2: 100% (2021 08:24) (100% - 100%)                          14.9   7.15  )-----------( 173      ( 2021 08:05 )             44.2     11-19    139  |  112<H>  |  12  ----------------------------<  105<H>  4.0   |  22  |  1.01    Ca    8.2<L>      2021 08:05        PHYSICAL EXAMINATION:    GENERAL APPEARANCE:  Pt. is not currently dyspneic, in no distress. Pt. is alert, oriented, and pleasant.  HEENT:  Pupils are normal and react normally. No icterus. Mucous membranes well colored.  NECK:  Supple. No lymphadenopathy. Jugular venous pressure not elevated. Carotids equal.   HEART:   The cardiac impulse has a normal quality. There are no murmurs, rubs or gallops noted  CHEST:  Chest is clear to auscultation. Normal respiratory effort.  ABDOMEN:  Soft and nontender.   EXTREMITIES:  There is no edema.   SKIN:  No rash or significant lesions are noted.      < from: TTE Echo Complete w/o Contrast w/ Doppler (21 @ 13:58) >   Impression     Summary     Normal appearing mitral valve structure.   Trace mitral regurgitation is present.   Normal aortic valve structure and function.   The tricuspid valve leaflets are thin and pliable; valve motion is normal.   Trace tricuspid valve regurgitation is present.   Normal appearing pulmonic valve structure.  Trace pulmonic valvular regurgitation is present.   Normal appearing left atrium.   Left ventricle systolic function appears preserved in the presence of a   cardiac arrhythmia. Left ventricle size and structure are within normal   limitations. Visual estimation of left ventricle ejection fraction is   60-65%.

## 2021-11-19 NOTE — PROGRESS NOTE ADULT - ASSESSMENT
72 y/o Male with h/o BPH, HLD, HTN was admitted on 11/16 for enlarged and "hard" left testicle w/ mild pain over past 2 days PTA. He denies prior similar episode, denies fever/chills, dysuria.  In ED, he was incidentally found to be in atrial flutter in RVR initially, controlled after IVP of dilt. He denies CP, sob, palpitations or h/o afib/flutter. In ED, he was given IV levaquin.    1. Acute left epididimitis. Pyuria. UTI with E.coli. BPH. A,flutter with RVR.   -BC x 2, urine c/s noted  -on ceftriaxone 2 gm IV qd and doxycyline 100 mg PO q12h # 3  -tolerating abx well so far; no side effects noted  -cardiology evaluation appreciated  -continue abx coverage   -tachycardia is improving  -monitor temps  -f/u CBC  -supportive care  2. Other issues:   -care per medicine    d/w Dr. Jaramillo

## 2021-11-19 NOTE — PROGRESS NOTE ADULT - SUBJECTIVE AND OBJECTIVE BOX
Reason for Admission: aflutter  History of Present Illness:   HPI:  71M w/PMH BPH, HLD, HTN, presents c/o enlarged and "hard" left testicle w/ mild pain over past 2 days. He denies prior similar episode, denies fever/chills, dysuria.  In ED, he was incidentally found to be in atrial flutter in rvr initially, controlled after IVP of dilt. He denies any cp, sob, palpitations or h/o afib/flutter.  He endorses smoking 1.5ppd x50+yrs, denies dx of copd.  Wife at bedside and wants to be updated regarding pt's care daily. At time of my exam, pt states his testicles seem to be back to normal after receiving abx.     Medical progress: Patient doing well. No new complaints. Denies any HA, CP, SOB. Now normal sinus rhythm. Care discussed with Dr. Guerra. Patient actively receiving IV antibiotics -  pending sensitivities, then d/c  Complaints: no new complaints  State of mind: normal conversation    REVIEW OF SYSTEMS:  General: NAD, hemodynamically stable   HEENT:  Eyes:  No visual loss   SKIN:  No rash or itching.  CARDIOVASCULAR:  No chest pain   RESPIRATORY:  No shortness of breath   GASTROINTESTINAL:  No anorexia, nausea   NEUROLOGICAL:  No headache, dizziness, syncope, paralysis, ataxia, numbness or tingling in the extremities. No change in bowel or bladder control.  MUSCULOSKELETAL:  No muscle, back pain, joint pain or stiffness.  HEMATOLOGIC:  No anemia, bleeding or bruising.  LYMPHATICS:  No enlarged nodes. No history of splenectomy.  ENDOCRINOLOGIC:  No reports of sweating, cold or heat intolerance. No polyuria or polydipsia.  ALLERGIES:  No history of asthma, hives, eczema or rhinitis.    Physical Exam:   GENERAL APPEARANCE:  NAD, hemodynamically stable  ICU Vital Signs Last 24 Hrs  T(C): 36.9 (19 Nov 2021 08:24), Max: 36.9 (18 Nov 2021 15:59)  T(F): 98.4 (19 Nov 2021 08:24), Max: 98.4 (18 Nov 2021 15:59)  HR: 88 (19 Nov 2021 10:19) (70 - 117)  BP: 114/81 (19 Nov 2021 10:19) (87/41 - 121/95)  BP(mean): 53 (19 Nov 2021 03:00) (53 - 53)  RR: 18 (19 Nov 2021 08:24) (18 - 18)  SpO2: 100% (19 Nov 2021 08:24) (100% - 100%)    HEENT:  Head is normocephalic    Skin:  Warm and dry without any rash   NECK:  Supple without lymphadenopathy.   HEART:  Regular rate and rhythm. normal S1 and S2, No M/R/G  LUNGS:  Good ins/exp effort, no W/R/R/C  ABDOMEN:  Soft, nontender, nondistended with good bowel sounds heard  EXTREMITIES:  Without cyanosis, clubbing or edema.   NEUROLOGICAL:  Gross nonfocal     Pt is a 71M w/PMH BPH, HLD, HTN, presents c/o enlarged and "hard" left testicle w/ mild pain over past 2 days.   +smoker. In ED, US scrotum : Left-sided epididymitis,  given IV levaquin, +UA, EKG a-flutter, given IVP dilt, HR 80s at time of my exam. bp stable.   Cxr: COPD      # Epididymitis  # UTI  - Rocephin 2g day # 3  - Doxy 100 mg day # 3  - PENDING SENSATIVITIES -  THEN DC  - ECHO: Left testis: 3.8 cm x 4.9 cm x 2.0 cm. Normal echogenicity and echotexture with no masses or areas of architectural distortion. Normal arterial and venous blood flow pattern.  Left epididymis: Hypervascularity in the body and tail with enlargement.  - care discussed with Dr. Graham    # New onset atrial flutter with RVR in setting of infection  - now in NSR  - Lopressor 50 mg po q6h  - Continue Eliquis (sent to the pharmacy)  - CHADSVAS - 2  - Close follow up with Dr. Shaikh, early next week    # tobacco use d/o  -  for cessation  - sweta patch and prn sweta gum     #PPX  - for dvt- on lovenox as above.

## 2021-11-19 NOTE — DISCHARGE NOTE NURSING/CASE MANAGEMENT/SOCIAL WORK - NSDCPEEMAIL_GEN_ALL_CORE
[facility] for Tobacco Control email [email or web address]

## 2021-11-19 NOTE — DISCHARGE NOTE PROVIDER - HOSPITAL COURSE
Reason for Admission: aflutter  History of Present Illness:   HPI:  71M w/PMH BPH, HLD, HTN, presents c/o enlarged and "hard" left testicle w/ mild pain over past 2 days. He denies prior similar episode, denies fever/chills, dysuria.  In ED, he was incidentally found to be in atrial flutter in rvr initially, controlled after IVP of dilt. He denies any cp, sob, palpitations or h/o afib/flutter.  He endorses smoking 1.5ppd x50+yrs, denies dx of copd.  Wife at bedside and wants to be updated regarding pt's care daily. At time of my exam, pt states his testicles seem to be back to normal after receiving abx.     Medical progress: Patient doing well. No new complaints. Denies any HA, CP, SOB. Now normal sinus rhythm. Care discussed with Dr. Guerra. Patient actively receiving IV antibiotics -  pending sensitivities, then d/c  Complaints: no new complaints  State of mind: normal conversation

## 2021-11-21 LAB
CULTURE RESULTS: SIGNIFICANT CHANGE UP
CULTURE RESULTS: SIGNIFICANT CHANGE UP
SPECIMEN SOURCE: SIGNIFICANT CHANGE UP
SPECIMEN SOURCE: SIGNIFICANT CHANGE UP

## 2021-11-23 ENCOUNTER — RX RENEWAL (OUTPATIENT)
Age: 71
End: 2021-11-23

## 2021-11-24 ENCOUNTER — RX RENEWAL (OUTPATIENT)
Age: 71
End: 2021-11-24

## 2021-11-24 ENCOUNTER — NON-APPOINTMENT (OUTPATIENT)
Age: 71
End: 2021-11-24

## 2021-11-24 DIAGNOSIS — I48.92 UNSPECIFIED ATRIAL FLUTTER: ICD-10-CM

## 2021-11-24 DIAGNOSIS — N40.0 BENIGN PROSTATIC HYPERPLASIA WITHOUT LOWER URINARY TRACT SYMPTOMS: ICD-10-CM

## 2021-11-24 DIAGNOSIS — N39.0 URINARY TRACT INFECTION, SITE NOT SPECIFIED: ICD-10-CM

## 2021-11-24 DIAGNOSIS — E78.5 HYPERLIPIDEMIA, UNSPECIFIED: ICD-10-CM

## 2021-11-24 DIAGNOSIS — F17.210 NICOTINE DEPENDENCE, CIGARETTES, UNCOMPLICATED: ICD-10-CM

## 2021-11-24 DIAGNOSIS — I10 ESSENTIAL (PRIMARY) HYPERTENSION: ICD-10-CM

## 2021-11-24 DIAGNOSIS — B96.20 UNSPECIFIED ESCHERICHIA COLI [E. COLI] AS THE CAUSE OF DISEASES CLASSIFIED ELSEWHERE: ICD-10-CM

## 2021-11-24 DIAGNOSIS — N45.1 EPIDIDYMITIS: ICD-10-CM

## 2021-11-24 RX ORDER — TAMSULOSIN HYDROCHLORIDE 0.4 MG/1
0.4 CAPSULE ORAL
Qty: 90 | Refills: 3 | Status: ACTIVE | COMMUNITY
Start: 2017-11-14 | End: 1900-01-01

## 2022-01-10 ENCOUNTER — APPOINTMENT (OUTPATIENT)
Dept: ELECTROPHYSIOLOGY | Facility: CLINIC | Age: 72
End: 2022-01-10

## 2022-01-14 RX ORDER — LEVOCETIRIZINE DIHYDROCHLORIDE 5 MG/1
5 TABLET ORAL DAILY
Qty: 30 | Refills: 4 | Status: ACTIVE | COMMUNITY
Start: 2021-08-17 | End: 1900-01-01

## 2022-03-16 ENCOUNTER — RX RENEWAL (OUTPATIENT)
Age: 72
End: 2022-03-16

## 2022-03-31 ENCOUNTER — APPOINTMENT (OUTPATIENT)
Dept: INTERNAL MEDICINE | Facility: CLINIC | Age: 72
End: 2022-03-31

## 2022-04-12 ENCOUNTER — RX RENEWAL (OUTPATIENT)
Age: 72
End: 2022-04-12

## 2022-04-29 ENCOUNTER — RX RENEWAL (OUTPATIENT)
Age: 72
End: 2022-04-29

## 2022-06-01 ENCOUNTER — APPOINTMENT (OUTPATIENT)
Dept: INTERNAL MEDICINE | Facility: CLINIC | Age: 72
End: 2022-06-01
Payer: MEDICARE

## 2022-06-01 VITALS
OXYGEN SATURATION: 94 % | WEIGHT: 221 LBS | SYSTOLIC BLOOD PRESSURE: 129 MMHG | HEIGHT: 71 IN | TEMPERATURE: 97.7 F | BODY MASS INDEX: 30.94 KG/M2 | DIASTOLIC BLOOD PRESSURE: 72 MMHG | HEART RATE: 54 BPM

## 2022-06-01 DIAGNOSIS — N39.0 URINARY TRACT INFECTION, SITE NOT SPECIFIED: ICD-10-CM

## 2022-06-01 DIAGNOSIS — B96.20 URINARY TRACT INFECTION, SITE NOT SPECIFIED: ICD-10-CM

## 2022-06-01 PROCEDURE — 99214 OFFICE O/P EST MOD 30 MIN: CPT

## 2022-06-01 RX ORDER — EZETIMIBE 10 MG/1
10 TABLET ORAL
Refills: 0 | Status: ACTIVE | COMMUNITY
Start: 2022-01-31

## 2022-06-01 RX ORDER — FLUTICASONE PROPIONATE 50 UG/1
50 SPRAY, METERED NASAL
Refills: 0 | Status: ACTIVE | COMMUNITY

## 2022-06-01 RX ORDER — METOPROLOL SUCCINATE 50 MG/1
50 TABLET, EXTENDED RELEASE ORAL
Refills: 0 | Status: ACTIVE | COMMUNITY
Start: 2022-05-24

## 2022-06-01 RX ORDER — METOPROLOL TARTRATE 50 MG/1
50 TABLET, FILM COATED ORAL
Qty: 180 | Refills: 0 | Status: DISCONTINUED | COMMUNITY
Start: 2021-12-13 | End: 2022-06-01

## 2022-06-01 NOTE — ASSESSMENT
[FreeTextEntry1] : 1.atrial flutter: Continue on Eliquis and metoprolol, follow-up with .\par Discussed signs and symptoms to warrant urgent evaluation with patient.\par \par 2.E. coli UTI: With vague symptoms last week, discussed starting on Augmentin for 5 days as sensitivities show sensitivity to all antibiotics.\par \par 3.tobacco abuse with COPD emphysema: Discussed cessation patient is not willing to cut back, he will try Nicorette gum and patches.  He understands the continued risk of tobacco use.

## 2022-06-01 NOTE — HISTORY OF PRESENT ILLNESS
[Post-hospitalization from ___ Hospital] : Post-hospitalization from [unfilled] Hospital [Admitted on: ___] : The patient was admitted on [unfilled] [Discharged on ___] : discharged on [unfilled] [Discharge Summary] : discharge summary [Pertinent Labs] : pertinent labs [Radiology Findings] : radiology findings [Discharge Med List] : discharge medication list [Med Reconciliation] : medication reconciliation has been completed [FreeTextEntry2] : Mr. SYLVESTER IBARRA is a 71 year M who comes in for a hospital follow up visit.\par Patient admitted to the hospital in November of last year with complaints of enlarged testicle with ultrasound showing left epididymitis, incidentally found to be in atrial flutter with rapid ventricular response and started on IV diltiazem drip.  Patient was to have ablation done inpatient but then decided to follow-up outpatient with [facility].  At this time patient is doing well on medical management and recently saw cardiology.\par Pt had bw and urine testing with  cardiology and cbc, cmp, iron studies, hba1c, psa, lipids all wnl and ua pos and ucx showed E.Coli 100,000 HFU sensitive to all abx. Pt has no current urinary or testicular sx. About 2 weeks ago pt had episode of dizziness with vomiting x 1 episode which then self resolved. \par Patient denies any cp, sob,abdominal pain, palpitations, fever, chills, constipation, diarrhea.\par

## 2022-07-02 ENCOUNTER — NON-APPOINTMENT (OUTPATIENT)
Age: 72
End: 2022-07-02

## 2022-07-18 ENCOUNTER — NON-APPOINTMENT (OUTPATIENT)
Age: 72
End: 2022-07-18

## 2022-08-03 ENCOUNTER — APPOINTMENT (OUTPATIENT)
Dept: INTERNAL MEDICINE | Facility: CLINIC | Age: 72
End: 2022-08-03

## 2022-09-01 ENCOUNTER — APPOINTMENT (OUTPATIENT)
Dept: INTERNAL MEDICINE | Facility: CLINIC | Age: 72
End: 2022-09-01

## 2022-09-01 VITALS
WEIGHT: 212 LBS | HEIGHT: 71 IN | DIASTOLIC BLOOD PRESSURE: 72 MMHG | TEMPERATURE: 98.3 F | SYSTOLIC BLOOD PRESSURE: 130 MMHG | BODY MASS INDEX: 29.68 KG/M2 | HEART RATE: 61 BPM | OXYGEN SATURATION: 96 %

## 2022-09-01 DIAGNOSIS — Z00.00 ENCOUNTER FOR GENERAL ADULT MEDICAL EXAMINATION W/OUT ABNORMAL FINDINGS: ICD-10-CM

## 2022-09-01 DIAGNOSIS — G47.33 OBSTRUCTIVE SLEEP APNEA (ADULT) (PEDIATRIC): ICD-10-CM

## 2022-09-01 PROCEDURE — G0439: CPT

## 2022-09-01 NOTE — HEALTH RISK ASSESSMENT
[Current] : Current [Yes] : Yes [4 or more  times a week (4 pts)] : 4 or more  times a week (4 points) [3 or 4 (1 pt)] : 3 or 4  (1 point) [Monthly (2 pts)] : Monthly (2 points) [No] : In the past 12 months have you used drugs other than those required for medical reasons? No [0] : 2) Feeling down, depressed, or hopeless: Not at all (0) [Patient reported colonoscopy was normal] : Patient reported colonoscopy was normal [PHQ-2 Negative - No further assessment needed] : PHQ-2 Negative - No further assessment needed [I have developed a follow-up plan documented below in the note.] : I have developed a follow-up plan documented below in the note. [de-identified] :  1 pack a day [[id number]] : 0 [EyeExamDate] : 2021 [ColonoscopyDate] : 2019

## 2022-09-01 NOTE — HISTORY OF PRESENT ILLNESS
[FreeTextEntry1] : Patient comes in for an annual wellness visit.\par  [de-identified] : SYLVESTER IBARRA is a 72 year M who comes in for an annual physical exam.\par Pt notes he recently met with pulm/sleep doctor last mo and had sleep study and dx with CAROLINE. He has f/u in 2 weeks and has not said yes to CPAP or done titration study yet. He does sleep talk and snore. \par He has seen  this year with no new testing per patient. \par Pt did see urologist and had cystoscopy 3 weeks ago which was normal per patient. \par Patient denies any cp, sob,abdominal pain, nausea, vomiting, palpitations, fever, chills, constipation, diarrhea.\par

## 2022-09-01 NOTE — ASSESSMENT
[FreeTextEntry1] : 1.health maintenance: Discussed fasting blood work to get.\par Patient counseled regarding recommendations for vaccines, seat belt safety, diet and exercise and all preventative screening.\par Up-to-date with colonoscopy and patient notes having prostate screening with urology.\par Patient states he has had abdominal aortic screening done with cardiology, obtain all records.\par \par 2.HLD: recheck fasting lipids, c/w atorvastatin 20 m once daily. Patient advised on low cholesterol diet-decrease in white carbs and exercise 150 minutes per week.\par \par 3. BPH: f/u with Urology, c/w tamsulosin and finasteride.  Obtain records.\par \par 4.tobacco abuse: Counseling provided to the patient for greater than 3 minutes. He does not wish to quit at this time. Patient has had lung cancer screening done in the past.  He will follow-up with pulmonary medicine to see if repeat screening CAT scan is needed.\par \par 5 obstructive sleep apnea: On recent sleep study per patient.  Obtain records from sleep medicine and encourage patient to have CPAP titration study done and follow-up with pulmonary medicine.\par \par 6.atrial flutter: Doing well on metoprolol and Eliquis at this time.  Follow-up with cardiology.

## 2022-09-21 ENCOUNTER — NON-APPOINTMENT (OUTPATIENT)
Age: 72
End: 2022-09-21

## 2022-09-21 LAB
ALBUMIN SERPL ELPH-MCNC: 4.6 G/DL
ALP BLD-CCNC: 64 U/L
ALT SERPL-CCNC: 14 U/L
ANION GAP SERPL CALC-SCNC: 13 MMOL/L
AST SERPL-CCNC: 20 U/L
BASOPHILS # BLD AUTO: 0.04 K/UL
BASOPHILS NFR BLD AUTO: 0.7 %
BILIRUB SERPL-MCNC: 0.4 MG/DL
BUN SERPL-MCNC: 13 MG/DL
CALCIUM SERPL-MCNC: 9.1 MG/DL
CHLORIDE SERPL-SCNC: 102 MMOL/L
CHOLEST SERPL-MCNC: 186 MG/DL
CO2 SERPL-SCNC: 21 MMOL/L
CREAT SERPL-MCNC: 0.93 MG/DL
EGFR: 87 ML/MIN/1.73M2
EOSINOPHIL # BLD AUTO: 0.29 K/UL
EOSINOPHIL NFR BLD AUTO: 4.8 %
ESTIMATED AVERAGE GLUCOSE: 120 MG/DL
GLUCOSE SERPL-MCNC: 86 MG/DL
HBA1C MFR BLD HPLC: 5.8 %
HCT VFR BLD CALC: 49.2 %
HDLC SERPL-MCNC: 47 MG/DL
HGB BLD-MCNC: 15.8 G/DL
IMM GRANULOCYTES NFR BLD AUTO: 0.2 %
LDLC SERPL CALC-MCNC: 106 MG/DL
LYMPHOCYTES # BLD AUTO: 2.53 K/UL
LYMPHOCYTES NFR BLD AUTO: 42.3 %
MAN DIFF?: NORMAL
MCHC RBC-ENTMCNC: 31.9 PG
MCHC RBC-ENTMCNC: 32.1 GM/DL
MCV RBC AUTO: 99.4 FL
MONOCYTES # BLD AUTO: 0.47 K/UL
MONOCYTES NFR BLD AUTO: 7.9 %
NEUTROPHILS # BLD AUTO: 2.64 K/UL
NEUTROPHILS NFR BLD AUTO: 44.1 %
NONHDLC SERPL-MCNC: 139 MG/DL
PLATELET # BLD AUTO: 190 K/UL
POTASSIUM SERPL-SCNC: 4.4 MMOL/L
PROT SERPL-MCNC: 7.5 G/DL
RBC # BLD: 4.95 M/UL
RBC # FLD: 13.3 %
SODIUM SERPL-SCNC: 137 MMOL/L
TRIGL SERPL-MCNC: 168 MG/DL
TSH SERPL-ACNC: 1.38 UIU/ML
WBC # FLD AUTO: 5.98 K/UL

## 2022-10-27 ENCOUNTER — LABORATORY RESULT (OUTPATIENT)
Age: 72
End: 2022-10-27

## 2022-11-15 ENCOUNTER — NON-APPOINTMENT (OUTPATIENT)
Age: 72
End: 2022-11-15

## 2023-03-30 ENCOUNTER — TRANSCRIPTION ENCOUNTER (OUTPATIENT)
Age: 73
End: 2023-03-30

## 2023-03-31 RX ORDER — APIXABAN 5 MG/1
5 TABLET, FILM COATED ORAL
Qty: 30 | Refills: 0 | Status: ACTIVE | COMMUNITY
Start: 2021-12-13 | End: 1900-01-01

## 2023-05-30 RX ORDER — ATORVASTATIN CALCIUM 40 MG/1
40 TABLET, FILM COATED ORAL
Qty: 90 | Refills: 0 | Status: ACTIVE | COMMUNITY
Start: 2020-08-10 | End: 1900-01-01

## 2023-06-28 ENCOUNTER — APPOINTMENT (OUTPATIENT)
Dept: INTERNAL MEDICINE | Facility: CLINIC | Age: 73
End: 2023-06-28
Payer: MEDICARE

## 2023-06-28 VITALS
HEIGHT: 71 IN | HEART RATE: 50 BPM | BODY MASS INDEX: 29.68 KG/M2 | WEIGHT: 212 LBS | DIASTOLIC BLOOD PRESSURE: 84 MMHG | OXYGEN SATURATION: 96 % | TEMPERATURE: 98.1 F | SYSTOLIC BLOOD PRESSURE: 118 MMHG

## 2023-06-28 DIAGNOSIS — I48.3 TYPICAL ATRIAL FLUTTER: ICD-10-CM

## 2023-06-28 DIAGNOSIS — N40.1 BENIGN PROSTATIC HYPERPLASIA WITH LOWER URINARY TRACT SYMPMS: ICD-10-CM

## 2023-06-28 DIAGNOSIS — E78.5 HYPERLIPIDEMIA, UNSPECIFIED: ICD-10-CM

## 2023-06-28 DIAGNOSIS — N13.8 BENIGN PROSTATIC HYPERPLASIA WITH LOWER URINARY TRACT SYMPMS: ICD-10-CM

## 2023-06-28 DIAGNOSIS — J43.9 EMPHYSEMA, UNSPECIFIED: ICD-10-CM

## 2023-06-28 DIAGNOSIS — Z72.0 TOBACCO USE: ICD-10-CM

## 2023-06-28 PROCEDURE — 99214 OFFICE O/P EST MOD 30 MIN: CPT

## 2023-06-28 RX ORDER — NICOTINE 21 MG/24HR
14 PATCH, TRANSDERMAL 24 HOURS TRANSDERMAL DAILY
Qty: 1 | Refills: 3 | Status: ACTIVE | COMMUNITY
Start: 2022-06-01 | End: 1900-01-01

## 2023-06-28 RX ORDER — NICOTINE POLACRILEX 4 MG/1
4 GUM, CHEWING ORAL
Qty: 1 | Refills: 0 | Status: DISCONTINUED | COMMUNITY
Start: 2022-06-01 | End: 2023-06-28

## 2023-06-28 RX ORDER — AMOXICILLIN AND CLAVULANATE POTASSIUM 875; 125 MG/1; MG/1
875-125 TABLET, COATED ORAL
Qty: 10 | Refills: 0 | Status: DISCONTINUED | COMMUNITY
Start: 2022-06-01 | End: 2023-06-28

## 2023-06-28 NOTE — HISTORY OF PRESENT ILLNESS
[FreeTextEntry1] : FU [de-identified] : SYLVESTER IBARRA is a 73 year M who comes in for a follow up visit.\par Pt with hx of CAROLINE, Atrial flutter, tobacco use, HLD, BPH, allergic rhinitis.\par He recently saw , cardiology and had echo that was stable per patient. He will be having monitor placed soon. He did f/u with sleep doctor and has not started on CPAP for CAROLINE. He continues to smoke 1 ppd, not able to cut back/quit. \par He did see , urologist and had another cystoscopy and PSA were stable/negative 6 months ago. \par Patient denies any cp, sob,abdominal pain, nausea, vomiting, palpitations, fever, chills, constipation, diarrhea.\par

## 2023-06-28 NOTE — ASSESSMENT
[FreeTextEntry1] : 1.health maintenance: Discussed fasting blood work to get.\par \par 2.HLD: recheck fasting lipids, c/w atorvastatin 40 mg once daily. Patient advised on low cholesterol diet-decrease in white carbs and exercise 150 minutes per week.\par \par 3. BPH: f/u with Urology, c/w tamsulosin and finasteride.  Obtain records from . \par \par 4.tobacco abuse: Counseling provided to the patient, c/w nicotine patch. He does not wish to quit at this time. Patient has had lung cancer screening done in the past about 6 months ago with pulm medicine.  He will follow-up with pulmonary medicine.\par \par 5 obstructive sleep apnea: On sleep study per patient.  Obtain records from sleep medicine and encourage patient to have CPAP titration study done and follow-up with pulmonary medicine.\par \par 6.atrial flutter: Doing well on metoprolol and Eliquis at this time.  Follow-up with cardiology.

## 2023-07-12 LAB
ALBUMIN SERPL ELPH-MCNC: 4.7 G/DL
ALP BLD-CCNC: 65 U/L
ALT SERPL-CCNC: 17 U/L
ANION GAP SERPL CALC-SCNC: 10 MMOL/L
AST SERPL-CCNC: 22 U/L
BILIRUB SERPL-MCNC: 0.7 MG/DL
BUN SERPL-MCNC: 16 MG/DL
CALCIUM SERPL-MCNC: 9.6 MG/DL
CHLORIDE SERPL-SCNC: 102 MMOL/L
CHOLEST SERPL-MCNC: 141 MG/DL
CO2 SERPL-SCNC: 26 MMOL/L
CREAT SERPL-MCNC: 1.04 MG/DL
EGFR: 76 ML/MIN/1.73M2
ESTIMATED AVERAGE GLUCOSE: 126 MG/DL
GLUCOSE SERPL-MCNC: 101 MG/DL
HBA1C MFR BLD HPLC: 6 %
HDLC SERPL-MCNC: 46 MG/DL
LDLC SERPL CALC-MCNC: 59 MG/DL
NONHDLC SERPL-MCNC: 94 MG/DL
POTASSIUM SERPL-SCNC: 5.2 MMOL/L
PROT SERPL-MCNC: 7.8 G/DL
SODIUM SERPL-SCNC: 139 MMOL/L
TRIGL SERPL-MCNC: 177 MG/DL

## 2023-07-21 ENCOUNTER — NON-APPOINTMENT (OUTPATIENT)
Age: 73
End: 2023-07-21

## 2023-12-12 ENCOUNTER — APPOINTMENT (OUTPATIENT)
Dept: INTERNAL MEDICINE | Facility: CLINIC | Age: 73
End: 2023-12-12

## 2023-12-20 ENCOUNTER — TRANSCRIPTION ENCOUNTER (OUTPATIENT)
Age: 73
End: 2023-12-20

## 2024-08-05 ENCOUNTER — NON-APPOINTMENT (OUTPATIENT)
Age: 74
End: 2024-08-05

## 2024-08-06 ENCOUNTER — APPOINTMENT (OUTPATIENT)
Dept: OTOLARYNGOLOGY | Facility: CLINIC | Age: 74
End: 2024-08-06

## 2024-08-06 PROBLEM — H72.91 PERFORATED EAR DRUM, RIGHT: Status: ACTIVE | Noted: 2024-08-06

## 2024-08-06 PROBLEM — H69.93 ETD (EUSTACHIAN TUBE DYSFUNCTION), BILATERAL: Status: ACTIVE | Noted: 2020-07-14

## 2024-08-06 PROCEDURE — 69210 REMOVE IMPACTED EAR WAX UNI: CPT

## 2024-08-06 PROCEDURE — 99213 OFFICE O/P EST LOW 20 MIN: CPT | Mod: 25

## 2024-08-06 NOTE — PROCEDURE
[Cerumen Impaction] : Cerumen Impaction [FreeTextEntry6] : Indication: ear plugging and discomfort Large amount cerumen cleared left and right ear instrumentation with curettes, forceps and suction. Ear canals and tympanic membranes  unremarkable.ad 5% perf

## 2024-08-06 NOTE — ASSESSMENT
[FreeTextEntry1] : adriana cleared au rt tm  small perf f/u 8 mo  20 minutes spent with patient with exam and counseling excluding time for any procedure.

## 2024-08-06 NOTE — HISTORY OF PRESENT ILLNESS
[de-identified] : rt ear plugged past week excessive mucous production, no recent uri hx hector and vent tube placed 11/23

## 2024-08-06 NOTE — REVIEW OF SYSTEMS
[Hearing Loss] : hearing loss [Negative] : Ear [Patient Intake Form Reviewed] : Patient intake form was reviewed [de-identified] : right ear hearing loss due to wax

## 2024-08-28 ENCOUNTER — APPOINTMENT (OUTPATIENT)
Dept: OTOLARYNGOLOGY | Facility: CLINIC | Age: 74
End: 2024-08-28

## 2025-02-06 ENCOUNTER — APPOINTMENT (OUTPATIENT)
Dept: PULMONOLOGY | Facility: CLINIC | Age: 75
End: 2025-02-06